# Patient Record
Sex: FEMALE | Race: WHITE | NOT HISPANIC OR LATINO | Employment: FULL TIME
[De-identification: names, ages, dates, MRNs, and addresses within clinical notes are randomized per-mention and may not be internally consistent; named-entity substitution may affect disease eponyms.]

---

## 2015-12-17 LAB
HPV ABSTRACT: NORMAL
PAP-ABSTRACT: NORMAL

## 2017-01-12 ENCOUNTER — RECORDS - HEALTHEAST (OUTPATIENT)
Dept: ADMINISTRATIVE | Facility: OTHER | Age: 52
End: 2017-01-12

## 2017-02-09 ENCOUNTER — COMMUNICATION - HEALTHEAST (OUTPATIENT)
Dept: INTERNAL MEDICINE | Facility: CLINIC | Age: 52
End: 2017-02-09

## 2017-04-18 ENCOUNTER — TRANSFERRED RECORDS (OUTPATIENT)
Dept: HEALTH INFORMATION MANAGEMENT | Facility: CLINIC | Age: 52
End: 2017-04-18

## 2017-04-18 LAB
HPV ABSTRACT: NORMAL
PAP-ABSTRACT: NORMAL

## 2018-07-05 ENCOUNTER — OFFICE VISIT (OUTPATIENT)
Dept: OBGYN | Facility: CLINIC | Age: 53
End: 2018-07-05
Payer: COMMERCIAL

## 2018-07-05 ENCOUNTER — RESULT FOLLOW UP (OUTPATIENT)
Dept: OBGYN | Facility: CLINIC | Age: 53
End: 2018-07-05

## 2018-07-05 VITALS
BODY MASS INDEX: 24.41 KG/M2 | HEIGHT: 64 IN | WEIGHT: 143 LBS | SYSTOLIC BLOOD PRESSURE: 128 MMHG | DIASTOLIC BLOOD PRESSURE: 74 MMHG

## 2018-07-05 DIAGNOSIS — N62 HYPERTROPHY OF BREAST: ICD-10-CM

## 2018-07-05 DIAGNOSIS — Z01.411 ENCOUNTER FOR GYNECOLOGICAL EXAMINATION WITH ABNORMAL FINDING: Primary | ICD-10-CM

## 2018-07-05 DIAGNOSIS — Z12.4 SCREENING FOR MALIGNANT NEOPLASM OF CERVIX: ICD-10-CM

## 2018-07-05 PROCEDURE — 99386 PREV VISIT NEW AGE 40-64: CPT | Performed by: OBSTETRICS & GYNECOLOGY

## 2018-07-05 PROCEDURE — G0145 SCR C/V CYTO,THINLAYER,RESCR: HCPCS | Performed by: OBSTETRICS & GYNECOLOGY

## 2018-07-05 PROCEDURE — 87624 HPV HI-RISK TYP POOLED RSLT: CPT | Performed by: OBSTETRICS & GYNECOLOGY

## 2018-07-05 RX ORDER — FLUTICASONE PROPIONATE 50 MCG
1 SPRAY, SUSPENSION (ML) NASAL DAILY
COMMUNITY
Start: 2017-10-29

## 2018-07-05 RX ORDER — CETIRIZINE HYDROCHLORIDE 10 MG/1
10 TABLET ORAL AT BEDTIME
COMMUNITY
Start: 2017-08-04

## 2018-07-05 RX ORDER — NAPROXEN SODIUM 220 MG
220 TABLET ORAL
COMMUNITY
End: 2022-04-25

## 2018-07-05 RX ORDER — ALBUTEROL SULFATE 90 UG/1
1 AEROSOL, METERED RESPIRATORY (INHALATION)
COMMUNITY
Start: 2017-04-14 | End: 2022-04-25

## 2018-07-05 RX ORDER — LANOLIN ALCOHOL/MO/W.PET/CERES
3 CREAM (GRAM) TOPICAL AT BEDTIME
COMMUNITY

## 2018-07-05 NOTE — LETTER
July 18, 2018      Norah Thomas  [address] [place] [address]    Dear ,      This letter is in regards to the PAP smear and HPV (Human Papillomavirus) test you had done recently. Your PAP test result is normal, but your HPV (Human Papillomavirus) test was positive.     About 80 percent of women have been exposed to HPV virus throughout their lifetime. There is no medication for the treatment of HPV. Typically your own immune system gets rid of the virus before it does harm. HPV is spread by direct skin-to-skin contact, including sexual intercourse, oral sex, anal sex, or any other contact involving the genital area (example: hand to genital contact). It is not possible to become infected with HPV by touching an object, such as a toilet seat. Most people who are infected with HPV have no signs or symptoms.    Things that you can do to boost your immune system and help your body get rid of HPV: get plenty of rest, eat a well-balanced diet of healthy foods, and stop smoking.     Please return in 1 year to repeat your pap smear and HPV test.     If you have additional questions regarding this result, please call [phone].    Sincerely,      Kacie Bautista MD/[facility]

## 2018-07-05 NOTE — NURSING NOTE
"Chief Complaint   Patient presents with     Physical       Initial /74  Ht 5' 3.5\" (1.613 m)  Wt 143 lb (64.9 kg)  LMP  (LMP Unknown)  BMI 24.93 kg/m2 Estimated body mass index is 24.93 kg/(m^2) as calculated from the following:    Height as of this encounter: 5' 3.5\" (1.613 m).    Weight as of this encounter: 143 lb (64.9 kg).  BP completed using cuff size: regular        The following HM Due: NONE      The following patient reported/Care Every where data was sent to:  P ABSTRACT QUALITY INITIATIVES [[id number]]  Colonoscopy done on this date: 10/2017 (approximately), by this group: [facility], results were normal.   Mammogram done on this date: 2018 (approximately), by this group: [facility], results were normal.   Pap smear done on this date: 2017 (approximately), by this group: [facility], results were normal.         patient has appointment for today    Rosa M Olivier CMA                "

## 2018-07-05 NOTE — Clinical Note
Colonoscopy done on this date: 10/2017 (approximately), by this group: [facility], results were normal.  Mammogram done on this date: 4/2018 (approximately), by this group: [facility], results were normal.  Pap smear done on this date: 4/2017 (approximately), by this group: [facility], results were normal.

## 2018-07-05 NOTE — MR AVS SNAPSHOT
"              After Visit Summary   7/5/2018    Norah Guzman    MRN: [id number]           Patient Information     Date Of Birth          1965        Visit Information        Provider Department      7/5/2018 10:30 AM Kacie Bautista MD [facility]        Today's Diagnoses     Encounter for gynecological examination with abnormal finding    -  1    Screening for malignant neoplasm of cervix        Hypertrophy of breast           Follow-ups after your visit        Who to contact     If you have questions or need follow up information about today's clinic visit or your schedule please contact [facility] directly at [phone].  Normal or non-critical lab and imaging results will be communicated to you by MyChart, letter or phone within 4 business days after the clinic has received the results. If you do not hear from us within 7 days, please contact the clinic through [employer] or phone. If you have a critical or abnormal lab result, we will notify you by phone as soon as possible.  Submit refill requests through [employer] or call your pharmacy and they will forward the refill request to us. Please allow 3 business days for your refill to be completed.          Additional Information About Your Visit        MyChart Information     [employer] gives you secure access to your electronic health record. If you see a primary care provider, you can also send messages to your care team and make appointments. If you have questions, please call your primary care clinic.  If you do not have a primary care provider, please call [phone] and they will assist you.        Care EveryWhere ID     This is your Care EveryWhere ID. This could be used by other organizations to access your [place] medical records  [id number]        Your Vitals Were     Height Last Period BMI (Body Mass Index)             5' 3.5\" (1.613 m) (LMP Unknown) 24.93 kg/m2          Blood Pressure from Last 3 " Encounters:   07/05/18 128/74    Weight from Last 3 Encounters:   07/05/18 143 lb (64.9 kg)              We Performed the Following     HPV High Risk Types DNA Cervical     Pap imaged thin layer screen with HPV - recommended age 30 - 65 years (select HPV order below)        Primary Care Provider    None Specified       No primary provider on file.        Equal Access to Services     MARY GARCIA : Hadii aad ku hadhamiltoncandy Calvert, wajeyda deana, qaybta kaalmasaurabh nicolas, tray campanigelmanan goldman . So [facility] [phone].    ATENCIÓN: Si habla español, tiene a brown disposición servicios gratuitos de asistencia lingüística. Llame al [phone].    We comply with applicable federal civil rights laws and [place] laws. We do not discriminate on the basis of race, color, national origin, age, disability, sex, sexual orientation, or gender identity.            Thank you!     Thank you for choosing [facility] [place]  for your care. Our goal is always to provide you with excellent care. Hearing back from our patients is one way we can continue to improve our services. Please take a few minutes to complete the written survey that you may receive in the mail after your visit with us. Thank you!             Your Updated Medication List - Protect others around you: Learn how to safely use, store and throw away your medicines at www.disposemymeds.org.          This list is accurate as of 7/5/18 11:59 PM.  Always use your most recent med list.                   Brand Name Dispense Instructions for use Diagnosis    albuterol 108 (90 Base) MCG/ACT Inhaler    PROAIR HFA/PROVENTIL HFA/VENTOLIN HFA     Inhale 1 puff into the lungs        Calcium carb-Vitamin D 500 mg [place]-200 units 500-200 MG-UNIT per tablet    OSCAL with D;Oyster Shell Calcium     Take 600 mg by mouth        cetirizine 10 MG tablet    zyrTEC     Take 10 mg by mouth        cholecalciferol 1000 UNIT tablet    vitamin D3     Take 1,000 Units  by mouth        fluticasone 50 MCG/ACT spray    FLONASE     1 spray        melatonin 3 MG tablet      Take 3 mg by mouth        naproxen sodium 220 MG tablet    ANAPROX     Take 220 mg by mouth

## 2018-07-05 NOTE — PROGRESS NOTES
Norah is a 52 year old  female who presents for annual exam.     Menses are irregular and normal and heavy lasting variable days.  Menses flow: normal.  No LMP recorded (lmp unknown).. Using none for contraception.  She is not currently considering pregnancy.  Besides routine health maintenance,  she would like to discuss breast reduction. She reports chronic longstanding pain her neck and back. She has needed injections in the past, now using NSAIDs. Has to be cautious with exercise, which she does enjoy. She gets headaches. She gets deep grooves in her shoulders from her bra. She finds breasts difficult to support during exercise. She is up to date on mammogram.  GYNECOLOGIC HISTORY:  Menarche: 11    Norah is sexually active with 1 male partner(s) and is currently in monogamous relationship with .    History sexually transmitted infections:No STD history  STI testing offered?  Declined  MEERA exposure: No  History of abnormal Pap smear: YES - updated in Problem List and Health Maintenance accordingly  Family history of breast CA: Yes (Please explain): maternal great aunt  Family history of uterine/ovarian CA: No    Family history of colon CA: Yes (Please explain): father, maternal grandfather, paternal uncle    HEALTH MAINTENANCE:  Cholesterol: (No results found for: CHOL History of abnormal lipids: No  Mammo: 2018 . History of abnormal Mammo: No.  Regular Self Breast Exams: occ  Calcium/Vitamin D intake: source:  dairy, dietary supplement(s) Adequate? Yes  TSH: (No results found for: TSH )  Pap; (No results found for: PAP )    HISTORY:  Obstetric History       T2      L2     SAB0   TAB0   Ectopic0   Multiple0   Live Births2       # Outcome Date GA Lbr Donnie/2nd Weight Sex Delivery Anes PTL Lv   2 Term     M    [facility]   1 Term     M    [facility]      Complications: Abruptio Placenta        Past Medical History:   Diagnosis Date     Cervical high risk HPV (human papillomavirus) test  positive 07/05/2018 07/05/18: see problem list.      No past surgical history on file.  Family History   Problem Relation Age of Onset     Other - See Comments Mother      bone loss     Colon Cancer Father      Other - See Comments Father      bone loss      Other - See Comments Sister      bone loss      Colon Cancer Maternal Grandfather      Breast Cancer Other      maternal great aunt      Colon Cancer Paternal Uncle      Other - See Comments Sister      bone loss     Uterine Cancer No family hx of      Ovarian Cancer No family hx of      Social History     Social History     Marital status: Single     Spouse name: N/A     Number of children: N/A     Years of education: N/A     Social History Main Topics     Smoking status: Former Smoker     Smokeless tobacco: Never Used     Alcohol use Yes      Comment: couple per week      Drug use: No     Sexual activity: Yes     Partners: Male     Other Topics Concern     None     Social History Narrative     None       Current Outpatient Prescriptions:      Calcium carb-Vitamin D 500 mg [place]-200 units (OYSTER SHELL CALCIUM/D) 500-200 MG-UNIT per tablet, Take 600 mg by mouth, Disp: , Rfl:      cetirizine (ZYRTEC) 10 MG tablet, Take 10 mg by mouth, Disp: , Rfl:      cholecalciferol (VITAMIN D3) 1000 UNIT tablet, Take 1,000 Units by mouth, Disp: , Rfl:      fluticasone (FLONASE) 50 MCG/ACT spray, 1 spray, Disp: , Rfl:      melatonin 3 MG tablet, Take 3 mg by mouth, Disp: , Rfl:      albuterol (PROAIR HFA/PROVENTIL HFA/VENTOLIN HFA) 108 (90 Base) MCG/ACT Inhaler, Inhale 1 puff into the lungs, Disp: , Rfl:      naproxen sodium (ANAPROX) 220 MG tablet, Take 220 mg by mouth, Disp: , Rfl:      Allergies   Allergen Reactions     Codeine Nausea and Vomiting     Fenoprofen Hives     Hydrocodone-Acetaminophen Nausea and Vomiting     Penicillins Unknown     Sulfa Drugs Unknown     Pt doesn't remember type of reaction       Past medical, surgical, social and family history were  "reviewed and updated in EPIC.    ROS:   C:     NEGATIVE for fever, chills, change in weight  I:       NEGATIVE for worrisome rashes, moles or lesions  E:     NEGATIVE for vision changes or irritation  E/M: NEGATIVE for ear, mouth and throat problems  R:     NEGATIVE for significant cough or SOB  CV:   NEGATIVE for chest pain, palpitations or peripheral edema  GI:     NEGATIVE for nausea, abdominal pain, heartburn, or change in bowel habits  :   NEGATIVE for frequency, dysuria, hematuria, vaginal discharge, or irregular bleeding  M:     As above  N:      NEGATIVE for weakness, dizziness or paresthesias  E:      NEGATIVE for temperature intolerance, skin/hair changes  P:      NEGATIVE for changes in mood or affect.    EXAM:  /74  Ht 5' 3.5\" (1.613 m)  Wt 143 lb (64.9 kg)  LMP  (LMP Unknown)  BMI 24.93 kg/m2   BMI: Body mass index is 24.93 kg/(m^2).  Constitutional: healthy, alert and no distress  Head: Normocephalic. No masses, lesions, tenderness or abnormalities  Neck: Neck supple. Trachea midline. No adenopathy. Thyroid symmetric, normal size.   Cardiovascular: RRR.   Respiratory: Negative.   Breast: Breasts reveal mild symmetric fibrocystic densities, but there are no dominant, discrete, fixed or suspicious masses found. Breasts are large and pendulous, out of proportion to her small frame.   Gastrointestinal: Abdomen soft, non-tender, non-distended. No masses, organomegaly.  :  Vulva:  No external lesions, normal female hair distribution, no inguinal adenopathy.    Urethra:  Midline, non-tender, well supported, no discharge  Vagina:  Moist, pink, no abnormal discharge, no lesions  Uterus:  Normal size, anteverted , non-tender, freely mobile  Ovaries:  No masses appreciated, non-tender, mobile  Rectal Exam: deferred  Musculoskeletal: extremities normal  Skin: no suspicious lesions or rashes  Psychiatric: Affect appropriate, cooperative,mentation appears normal.     COUNSELING:   Reviewed preventive " health counseling, as reflected in patient instructions       Regular exercise       Healthy diet/nutrition   reports that she has quit smoking. She has never used smokeless tobacco.    Body mass index is 24.93 kg/(m^2).    FRAX Risk Assessment    ASSESSMENT:  52 year old female with satisfactory annual exam  (Z01.411) Encounter for gynecological examination with abnormal finding  (primary encounter diagnosis)  Comment:   Plan: Lipids normal 2017. Thyroid normal 2017. Mammo 2018. Colonoscopy 2017.  Consider shingles vaccine    (Z12.4) Screening for malignant neoplasm of cervix  (primary encounter diagnosis)  Comment:   Plan: Pap imaged thin layer screen with HPV -         recommended age 30 - 65 years (select HPV order        below), HPV High Risk Types DNA Cervical   Discussed ASCCP guidelines. Has been getting nearly annual pap. Discussed guidelines and rationale. Will do pap and cotest today and transition to ASCCP guidelines.          (N62) Hypertrophy of breast  Comment:   Plan: Will refer for breast reduction.

## 2018-07-10 LAB
COPATH REPORT: NORMAL
PAP: NORMAL

## 2018-07-12 LAB
FINAL DIAGNOSIS: ABNORMAL
HPV HR 12 DNA CVX QL NAA+PROBE: POSITIVE
HPV16 DNA SPEC QL NAA+PROBE: NEGATIVE
HPV18 DNA SPEC QL NAA+PROBE: NEGATIVE
SPECIMEN DESCRIPTION: ABNORMAL
SPECIMEN SOURCE CVX/VAG CYTO: ABNORMAL

## 2018-12-14 ENCOUNTER — RECORDS - HEALTHEAST (OUTPATIENT)
Dept: ADMINISTRATIVE | Facility: OTHER | Age: 53
End: 2018-12-14

## 2019-08-22 ENCOUNTER — OFFICE VISIT (OUTPATIENT)
Dept: OBGYN | Facility: CLINIC | Age: 54
End: 2019-08-22
Payer: COMMERCIAL

## 2019-08-22 ENCOUNTER — RESULT FOLLOW UP (OUTPATIENT)
Dept: OBGYN | Facility: CLINIC | Age: 54
End: 2019-08-22

## 2019-08-22 VITALS
OXYGEN SATURATION: 99 % | WEIGHT: 145 LBS | BODY MASS INDEX: 24.75 KG/M2 | DIASTOLIC BLOOD PRESSURE: 84 MMHG | SYSTOLIC BLOOD PRESSURE: 125 MMHG | HEART RATE: 83 BPM | HEIGHT: 64 IN

## 2019-08-22 DIAGNOSIS — Z01.419 ENCOUNTER FOR GYNECOLOGICAL EXAMINATION WITHOUT ABNORMAL FINDING: Primary | ICD-10-CM

## 2019-08-22 DIAGNOSIS — Z12.31 ENCOUNTER FOR SCREENING MAMMOGRAM FOR BREAST CANCER: ICD-10-CM

## 2019-08-22 DIAGNOSIS — R87.810 CERVICAL HIGH RISK HPV (HUMAN PAPILLOMAVIRUS) TEST POSITIVE: ICD-10-CM

## 2019-08-22 DIAGNOSIS — Z12.4 SCREENING FOR MALIGNANT NEOPLASM OF CERVIX: ICD-10-CM

## 2019-08-22 PROCEDURE — 99396 PREV VISIT EST AGE 40-64: CPT | Performed by: OBSTETRICS & GYNECOLOGY

## 2019-08-22 PROCEDURE — G0123 SCREEN CERV/VAG THIN LAYER: HCPCS | Performed by: OBSTETRICS & GYNECOLOGY

## 2019-08-22 PROCEDURE — 87624 HPV HI-RISK TYP POOLED RSLT: CPT | Performed by: OBSTETRICS & GYNECOLOGY

## 2019-08-22 ASSESSMENT — ANXIETY QUESTIONNAIRES
7. FEELING AFRAID AS IF SOMETHING AWFUL MIGHT HAPPEN: NOT AT ALL
1. FEELING NERVOUS, ANXIOUS, OR ON EDGE: SEVERAL DAYS
3. WORRYING TOO MUCH ABOUT DIFFERENT THINGS: SEVERAL DAYS
5. BEING SO RESTLESS THAT IT IS HARD TO SIT STILL: MORE THAN HALF THE DAYS
6. BECOMING EASILY ANNOYED OR IRRITABLE: MORE THAN HALF THE DAYS
GAD7 TOTAL SCORE: 8
2. NOT BEING ABLE TO STOP OR CONTROL WORRYING: SEVERAL DAYS

## 2019-08-22 ASSESSMENT — PATIENT HEALTH QUESTIONNAIRE - PHQ9
5. POOR APPETITE OR OVEREATING: SEVERAL DAYS
SUM OF ALL RESPONSES TO PHQ QUESTIONS 1-9: 8

## 2019-08-22 ASSESSMENT — MIFFLIN-ST. JEOR: SCORE: 1234.78

## 2019-08-22 NOTE — PROGRESS NOTES
Norah is a 53 year old  female who presents for annual exam.     Menses are irregular and normal and heavy lasting variable days with hot flashes. Menses flow: normal.  LMP: 2019 Using none for contraception.  She is not currently considering pregnancy.  Besides routine health maintenance, she has no other health concerns today .   Had breast reduction and is very pleased. Mammogram in January (wait one year after surgery).   GYNECOLOGIC HISTORY:  Menarche: 11  Age at first intercourse: declined   Number of lifetime partners: declined  Norah is sexually active with 1 male partner(s) and is currently in monogamous relationship with partner.   History sexually transmitted infections:No STD history  STI testing offered?  Declined  MEERA exposure: No  History of abnormal Pap smear: YES - updated in Problem List and Health Maintenance accordingly  Family history of breast CA: Yes (Please explain): maternal great aunt  Family history of uterine/ovarian CA: No     Family history of colon CA: Yes (Please explain): father, maternal grandfather, paternal uncle  HEALTH MAINTENANCE:  Cholesterol: (No results found for: CHOL History of abnormal lipids: No  Mammo: 2018 not one till 2020 . History of abnormal Mammo: No.  Regular Self Breast Exams: Yes  Calcium/Vitamin D intake: source: dairy, dietary supplement(s) Adequate? Yes  TSH: (No results found for: TSH )  Pap; (No results found for: PAP )  Lab Results   Component Value Date    PAP NIL 2018    )    HISTORY:  OB History    Para Term  AB Living   2 2 2 0 0 2   SAB TAB Ectopic Multiple Live Births   0 0 0 0 2      # Outcome Date GA Lbr Donnie/2nd Weight Sex Delivery Anes PTL Lv   2 Term     M    [facility]   1 Term     M    [facility]      Complications: Abruptio Placenta     Past Medical History:   Diagnosis Date     Cervical high risk HPV (human papillomavirus) test positive 2018: see problem list.      History reviewed. No  pertinent surgical history.  Family History   Problem Relation Age of Onset     Other - See Comments Mother         bone loss     Colon Cancer Father      Other - See Comments Father         bone loss      Other - See Comments Sister         bone loss      Colon Cancer Maternal Grandfather      Breast Cancer Other         maternal great aunt      Colon Cancer Paternal Uncle      Other - See Comments Sister         bone loss     Uterine Cancer No family hx of      Ovarian Cancer No family hx of      Social History     Socioeconomic History     Marital status: Single     Spouse name: Not on file     Number of children: Not on file     Years of education: Not on file     Highest education level: Not on file   Occupational History     Not on file   Social Needs     Financial resource strain: Not on file     Food insecurity:     Worry: Not on file     Inability: Not on file     Transportation needs:     Medical: Not on file     Non-medical: Not on file   Tobacco Use     Smoking status: Former Smoker     Smokeless tobacco: Never Used   Substance and Sexual Activity     Alcohol use: Yes     Comment: couple per week      Drug use: No     Sexual activity: Yes     Partners: Male   Lifestyle     Physical activity:     Days per week: Not on file     Minutes per session: Not on file     Stress: Not on file   Relationships     Social connections:     Talks on phone: Not on file     Gets together: Not on file     Attends Gnosticist service: Not on file     Active member of club or organization: Not on file     Attends meetings of clubs or organizations: Not on file     Relationship status: Not on file     Intimate partner violence:     Fear of current or ex partner: Not on file     Emotionally abused: Not on file     Physically abused: Not on file     Forced sexual activity: Not on file   Other Topics Concern     Not on file   Social History Narrative     Not on file       Current Outpatient Medications:      albuterol (PROAIR  "HFA/PROVENTIL HFA/VENTOLIN HFA) 108 (90 Base) MCG/ACT Inhaler, Inhale 1 puff into the lungs, Disp: , Rfl:      Calcium carb-Vitamin D 500 mg [place]-200 units (OYSTER SHELL CALCIUM/D) 500-200 MG-UNIT per tablet, Take 600 mg by mouth, Disp: , Rfl:      cetirizine (ZYRTEC) 10 MG tablet, Take 10 mg by mouth, Disp: , Rfl:      cholecalciferol (VITAMIN D3) 1000 UNIT tablet, Take 1,000 Units by mouth, Disp: , Rfl:      fluticasone (FLONASE) 50 MCG/ACT spray, 1 spray, Disp: , Rfl:      melatonin 3 MG tablet, Take 3 mg by mouth, Disp: , Rfl:      naproxen sodium (ANAPROX) 220 MG tablet, Take 220 mg by mouth, Disp: , Rfl:      Allergies   Allergen Reactions     Codeine Nausea and Vomiting     Fenoprofen Hives     Hydrocodone-Acetaminophen Nausea and Vomiting     Penicillins Unknown     Sulfa Drugs Unknown     Pt doesn't remember type of reaction       Past medical, surgical, social and family history were reviewed and updated in EPIC.    ROS:   C:     NEGATIVE for fever, chills, change in weight  I:       NEGATIVE for worrisome rashes, moles or lesions  E:     NEGATIVE for vision changes or irritation  E/M: NEGATIVE for ear, mouth and throat problems  R:     NEGATIVE for significant cough or SOB  CV:   NEGATIVE for chest pain, palpitations or peripheral edema  GI:     NEGATIVE for nausea, abdominal pain, heartburn, or change in bowel habits  :   NEGATIVE for frequency, dysuria, hematuria, vaginal discharge, or irregular bleeding  M:     NEGATIVE for significant arthralgias or myalgia  N:      NEGATIVE for weakness, dizziness or paresthesias  E:      NEGATIVE for temperature intolerance, skin/hair changes  P:      NEGATIVE for changes in mood or affect.    EXAM:  /84   Pulse 83   Ht 1.613 m (5' 3.5\")   Wt 65.8 kg (145 lb)   LMP 03/01/2019 (Approximate)   SpO2 99%   Breastfeeding? No   BMI 25.28 kg/m     BMI: Body mass index is 25.28 kg/m .  Constitutional: healthy, alert and no distress  Head: Normocephalic. No " masses, lesions, tenderness or abnormalities  Neck: Neck supple. Trachea midline. No adenopathy. Thyroid symmetric, normal size.   Cardiovascular: RRR.   Respiratory: Negative.   Breast: Breasts reveal mild symmetric fibrocystic densities, but there are no dominant, discrete, fixed or suspicious masses found.  Gastrointestinal: Abdomen soft, non-tender, non-distended. No masses, organomegaly.  :  Vulva:  No external lesions, normal female hair distribution, no inguinal adenopathy.    Urethra:  Midline, non-tender, well supported, no discharge  Vagina:  Moist, pink, no abnormal discharge, no lesions  Uterus:  Normal size, anteverted , non-tender, freely mobile. Cervix small, no lesions, evidence of prior excisional procedure.   Ovaries:  No masses appreciated, non-tender, mobile  Rectal Exam: deferred  Musculoskeletal: extremities normal  Skin: no suspicious lesions or rashes  Psychiatric: Affect appropriate, cooperative,mentation appears normal.     COUNSELING:   Reviewed preventive health counseling, as reflected in patient instructions       Regular exercise       Healthy diet/nutrition   reports that she has quit smoking. She has never used smokeless tobacco.    Body mass index is 25.28 kg/m .  Weight management plan: Discussed healthy diet and exercise guidelines  FRAX Risk Assessment    ASSESSMENT:  53 year old female with satisfactory annual exam  (Z01.419) Encounter for gynecological examination without abnormal finding  (primary encounter diagnosis)  Comment:   Plan: Pap today  Mammogram in January    (Z12.4) Screening for malignant neoplasm of cervix  Comment:   Plan: Pap imaged thin layer screen with HPV -         recommended age 30 - 65 years (select HPV order        below), HPV High Risk Types DNA Cervical       (Z12.31) Encounter for screening mammogram for breast cancer  Comment:   Plan: *MA Screening Digital Bilateral        Does at [facility]

## 2019-08-22 NOTE — NURSING NOTE
"Chief Complaint   Patient presents with     Physical       Initial /84   Pulse 83   Ht 1.613 m (5' 3.5\")   Wt 65.8 kg (145 lb)   LMP 2019 (Approximate)   SpO2 99%   Breastfeeding? No   BMI 25.28 kg/m   Estimated body mass index is 25.28 kg/m  as calculated from the following:    Height as of this encounter: 1.613 m (5' 3.5\").    Weight as of this encounter: 65.8 kg (145 lb).  BP completed using cuff size: regular    Questioned patient about current smoking habits.  Pt. quit smoking some time ago.          The following HM Due: pap smear and shingles    The following patient reported/Care Every where data was sent to:  P ABSTRACT QUALITY INITIATIVES [[id number]]  n/a      patient has appointment for today              "

## 2019-08-22 NOTE — LETTER
September 9, 2019    Norah Thomas  [address] [place] [address]    Dear MsAdrianaThomas,  This letter is regarding your recent Pap smear (cervical cancer screening) and Human Papillomavirus (HPV) test.  We are happy to inform you that your Pap smear result is normal. Cervical cancer is closely linked with certain types of HPV. Your results showed no evidence of high-risk HPV.  We recommend you have your next PAP smear and HPV test in 1 year.  You will still need to return to the clinic every year for an annual exam and other preventive tests.  If you have additional questions regarding this result, please call our registered nurse, Makayla at [phone].  Sincerely,    Kacie Bautista MD /[facility]

## 2019-08-23 ASSESSMENT — ANXIETY QUESTIONNAIRES: GAD7 TOTAL SCORE: 8

## 2019-08-29 LAB
FINAL DIAGNOSIS: NORMAL
HPV HR 12 DNA CVX QL NAA+PROBE: NEGATIVE
HPV16 DNA SPEC QL NAA+PROBE: NEGATIVE
HPV18 DNA SPEC QL NAA+PROBE: NEGATIVE
SPECIMEN DESCRIPTION: NORMAL
SPECIMEN SOURCE CVX/VAG CYTO: NORMAL

## 2019-08-30 LAB
COPATH REPORT: NORMAL
PAP: NORMAL

## 2019-09-09 NOTE — PROGRESS NOTES
07/05/18: NIL pap, + HR HPV (not 16 or 18) result. Plan cotest in 1 year.   8/22/19 NIL, Neg HPV. Plan per provider, Patient wants to do q1 year for now due to h/o LEEP. Pt was advised.  09/09/19 Result letter sent at request of RN. ([facility])

## 2020-03-11 ENCOUNTER — HEALTH MAINTENANCE LETTER (OUTPATIENT)
Age: 55
End: 2020-03-11

## 2021-01-03 ENCOUNTER — HEALTH MAINTENANCE LETTER (OUTPATIENT)
Age: 56
End: 2021-01-03

## 2021-03-25 ENCOUNTER — TRANSFERRED RECORDS (OUTPATIENT)
Dept: MULTI SPECIALTY CLINIC | Facility: CLINIC | Age: 56
End: 2021-03-25

## 2021-05-06 NOTE — PROGRESS NOTES
Please abstract the following data from this visit with this patient into the appropriate field in Epic:    Tests that can be patient reported without a hard copy:    Mammogram done on this date: 3/25/2021 (approximately), by this group: [facility], results were negative.       Norah is a 55 year old  who presents for annual exam.   Postmenopausal since 54.  She is having hot flashes, mild to moderate, night sweats, vaginal dryness, decreased libido and decreased mental sharpness. No vaginal bleeding noted.     Besides routine health maintenance, she has no other health concerns today .  Working and getting her masters.  Her mom moved to town last year.   Started meds for hypertension    GYNECOLOGIC HISTORY:  Menarche: 11   Age at first intercourse: 14 Number of lifetime partners: more than 6  She is not sexually active.    History sexually transmitted infections:No STD history  STI testing offered?  Declined  Estrogen replacement therapy: No  MEERA exposure: No    History of abnormal Pap smear: YES - updated in Problem List and Health Maintenance accordingly  Family history of breast CA: Yes (Please explain): maternal great aunt  Family history of uterine/ovarian CA: No  Family history of colon CA: Yes (Please explain): father, maternal grandfather, paternal uncle    HEALTH MAINTENANCE:  Cholesterol: (No components found for: CHOL2 ) History of abnormal lipids: No  Mammo: 3/25/2021 . History of abnormal Mammo: yes, benign  tumor removed in the middle .  Regular Self Breast Exams: sometimes  Colonoscopy: 10/26/2017 History of abnormal Colonoscopy: benign polyps removed  Dexa: 2017 History of abnormal Dexa: Yes and osteopenia  Calcium/Vitamin D intake: source: some dairy, dietary supplement(s), green leafy veggies (more in summertime) Adequate? Yes  TSH: (No components found for: TSH1 )  Pap; (  Lab Results   Component Value Date    PAP NIL 2019    PAP NIL 2018    )    HISTORY:  OB History     Para Term  AB Living   2 2 2 0 0 2   SAB TAB Ectopic Multiple Live Births   0 0 0 0 2      # Outcome Date GA Lbr Donnie/2nd Weight Sex Delivery Anes PTL Lv   2 Term     M    [facility]   1 Term     M    [facility]      Complications: Abruptio Placenta     Past Medical History:   Diagnosis Date     Cervical high risk HPV (human papillomavirus) test positive 2018: see problem list.      Past Surgical History:   Procedure Laterality Date     MAMMOPLASTY REDUCTION BILATERAL       Family History   Problem Relation Age of Onset     Other - See Comments Mother         bone loss     Hypertension Mother      Colon Cancer Father      Other - See Comments Father         bone loss      Other - See Comments Sister         bone loss      Hypertension Sister      Colon Cancer Maternal Grandfather      Breast Cancer Other         maternal great aunt      Colon Cancer Paternal Uncle      Other - See Comments Sister         bone loss     Hypertension Sister      Uterine Cancer No family hx of      Ovarian Cancer No family hx of      Social History     Socioeconomic History     Marital status: Single     Spouse name: Not on file     Number of children: Not on file     Years of education: Not on file     Highest education level: Not on file   Occupational History     Not on file   Social Needs     Financial resource strain: Not on file     Food insecurity     Worry: Not on file     Inability: Not on file     Transportation needs     Medical: Not on file     Non-medical: Not on file   Tobacco Use     Smoking status: Former Smoker     Smokeless tobacco: Never Used   Substance and Sexual Activity     Alcohol use: Yes     Comment: couple per week      Drug use: No     Sexual activity: Yes     Partners: Male     Birth control/protection: Female Surgical     Comment: Tubal    Lifestyle     Physical activity     Days per week: Not on file     Minutes per session: Not on file     Stress: Not on file   Relationships      Social connections     Talks on phone: Not on file     Gets together: Not on file     Attends Sikhism service: Not on file     Active member of club or organization: Not on file     Attends meetings of clubs or organizations: Not on file     Relationship status: Not on file     Intimate partner violence     Fear of current or ex partner: Not on file     Emotionally abused: Not on file     Physically abused: Not on file     Forced sexual activity: Not on file   Other Topics Concern     Parent/sibling w/ CABG, MI or angioplasty before 65F 55M? Not Asked   Social History Narrative     Not on file       Current Outpatient Medications:      amLODIPine (NORVASC) 5 MG tablet, Take 5 mg by mouth daily, Disp: , Rfl:      Calcium carb-Vitamin D 500 mg [place]-200 units (OYSTER SHELL CALCIUM/D) 500-200 MG-UNIT per tablet, Take 600 mg by mouth, Disp: , Rfl:      cetirizine (ZYRTEC) 10 MG tablet, Take 10 mg by mouth, Disp: , Rfl:      cholecalciferol (VITAMIN D3) 1000 UNIT tablet, Take 1,000 Units by mouth, Disp: , Rfl:      fluticasone (FLONASE) 50 MCG/ACT spray, 1 spray, Disp: , Rfl:      Ibuprofen (ADVIL PO), , Disp: , Rfl:      melatonin 3 MG tablet, Take 3 mg by mouth, Disp: , Rfl:      albuterol (PROAIR HFA/PROVENTIL HFA/VENTOLIN HFA) 108 (90 Base) MCG/ACT Inhaler, Inhale 1 puff into the lungs, Disp: , Rfl:      naproxen sodium (ANAPROX) 220 MG tablet, Take 220 mg by mouth, Disp: , Rfl:      Allergies   Allergen Reactions     Fenoprofen Hives     Codeine Nausea and Vomiting     Hydrocodone-Acetaminophen Nausea and Vomiting     Penicillins Unknown and Other (See Comments)     Pt doesn't remember type of reaction  Pt doesn't remember type of reaction       Sulfa Drugs Unknown     Pt doesn't remember type of reaction     Venlafaxine Other (See Comments)     Pt seen in ER for HTN, tachycardia, etc.   Made all vital sign go very high, Went to Er         Past medical, surgical, social and family history were reviewed and  "updated in EPIC.    ROS:   C:       NEGATIVE for fever, chills, change in weight  I:         NEGATIVE for worrisome rashes, moles or lesions  E:       NEGATIVE for vision changes or irritation  E/M:   NEGATIVE for ear, mouth and throat problems  R:       NEGATIVE for significant cough or SOB  CV:     NEGATIVE for chest pain, palpitations or peripheral edema  GI:      NEGATIVE for nausea, abdominal pain, heartburn, or change in bowel habits  :    NEGATIVE for frequency, dysuria, hematuria, vaginal discharge, or bleeding  M:       NEGATIVE for significant arthralgias or myalgia  N:       NEGATIVE for weakness, dizziness or paresthesias  E:       NEGATIVE for temperature intolerance, skin/hair changes  P:       NEGATIVE for changes in mood or affect    EXAM:  /88 (BP Location: Right arm, Patient Position: Sitting, Cuff Size: Adult Regular)   Pulse 81   Temp 97  F (36.1  C) (Oral)   Ht 1.588 m (5' 2.5\")   Wt 67.9 kg (149 lb 10.1 oz)   LMP 03/01/2019 (Approximate)   Breastfeeding No   BMI 26.93 kg/m     BMI: Body mass index is 26.93 kg/m .  Constitutional: healthy, alert and no distress  Head: Normocephalic. No masses, lesions, tenderness or abnormalities  Neck: Neck supple. Trachea midline. No adenopathy. Thyroid symmetric, normal size.   Cardiovascular: RRR.   Respiratory: Negative.   Breast: Breasts reveal mild symmetric fibrocystic densities, but there are no dominant, discrete, fixed or suspicious masses found.  Gastrointestinal: Abdomen soft, non-tender, non-distended. No masses, organomegaly  :  Vulva:  No external lesions, normal female hair distribution, no inguinal adenopathy.    Urethra:  Midline, non-tender, well supported, no discharge  Vagina:  Atrophic, no abnormal discharge, no lesions  Uterus:  Normal size, anteverted , non-tender, freely mobile  Ovaries:  No masses appreciated, non-tender, mobile  Rectal Exam: deferred  Musculoskeletal: extremities normal  Skin: no suspicious lesions or " inez  Psychiatric: Affect appropriate, cooperative,mentation appears normal.     COUNSELING:   Reviewed preventive health counseling, as reflected in patient instructions       Regular exercise       Healthy diet/nutrition       Colon cancer screening       Consider lung cancer screening for ages 55-80 years and 30 pack-year smoking history Discussed smoking history- maybe has 30 pack year history. Smoked on and off over the years.    reports that she quit smoking about 3 years ago. She started smoking about 45 years ago. She has never used smokeless tobacco.    Body mass index is 26.93 kg/m .  Weight management plan: Discussed healthy diet and exercise guidelines    FRAX Risk Assessment  ASSESSMENT:  55 year old  with satisfactory annual exam  (Z00.00) Routine general medical examination at a health care facility  (primary encounter diagnosis)  Comment:   Plan: HPV High Risk Types DNA Cervical, Pap imaged         thin layer screen with HPV - recommended age 30        - 65 years (select HPV order below)            (Z12.4) Screening for cervical cancer  Comment:   Plan: HPV High Risk Types DNA Cervical, Pap imaged         thin layer screen with HPV - recommended age 30        - 65 years (select HPV order below)            (Z12.31) Encounter for screening mammogram for malignant neoplasm of breast  Comment:   Plan: MA Screening Digital Bilateral

## 2021-05-11 ENCOUNTER — OFFICE VISIT (OUTPATIENT)
Dept: OBGYN | Facility: CLINIC | Age: 56
End: 2021-05-11
Payer: COMMERCIAL

## 2021-05-11 VITALS
WEIGHT: 149.63 LBS | BODY MASS INDEX: 26.51 KG/M2 | HEART RATE: 81 BPM | DIASTOLIC BLOOD PRESSURE: 88 MMHG | HEIGHT: 63 IN | TEMPERATURE: 97 F | SYSTOLIC BLOOD PRESSURE: 124 MMHG

## 2021-05-11 DIAGNOSIS — Z12.4 SCREENING FOR CERVICAL CANCER: ICD-10-CM

## 2021-05-11 DIAGNOSIS — Z00.00 ROUTINE GENERAL MEDICAL EXAMINATION AT A HEALTH CARE FACILITY: Primary | ICD-10-CM

## 2021-05-11 DIAGNOSIS — Z12.31 ENCOUNTER FOR SCREENING MAMMOGRAM FOR MALIGNANT NEOPLASM OF BREAST: ICD-10-CM

## 2021-05-11 PROBLEM — K35.80 ACUTE APPENDICITIS: Status: ACTIVE | Noted: 2020-07-23

## 2021-05-11 PROBLEM — H10.13 ALLERGIC CONJUNCTIVITIS, BILATERAL: Status: ACTIVE | Noted: 2017-11-14

## 2021-05-11 PROBLEM — K58.9 IBS (IRRITABLE BOWEL SYNDROME): Status: ACTIVE | Noted: 2020-07-23

## 2021-05-11 PROBLEM — M85.80 OSTEOPENIA: Status: ACTIVE | Noted: 2017-04-29

## 2021-05-11 PROBLEM — N62 MACROMASTIA: Status: ACTIVE | Noted: 2018-10-31

## 2021-05-11 PROBLEM — J30.9 ALLERGIC RHINITIS DUE TO ALLERGEN: Status: ACTIVE | Noted: 2017-11-14

## 2021-05-11 PROCEDURE — 88175 CYTOPATH C/V AUTO FLUID REDO: CPT | Performed by: OBSTETRICS & GYNECOLOGY

## 2021-05-11 PROCEDURE — 87624 HPV HI-RISK TYP POOLED RSLT: CPT | Performed by: OBSTETRICS & GYNECOLOGY

## 2021-05-11 PROCEDURE — 99396 PREV VISIT EST AGE 40-64: CPT | Performed by: OBSTETRICS & GYNECOLOGY

## 2021-05-11 RX ORDER — AZELASTINE 1 MG/ML
1 SPRAY, METERED NASAL
COMMUNITY
End: 2022-04-25

## 2021-05-11 RX ORDER — AMLODIPINE BESYLATE 5 MG/1
5 TABLET ORAL DAILY
COMMUNITY
Start: 2021-03-27

## 2021-05-11 RX ORDER — OLOPATADINE HYDROCHLORIDE 2 MG/ML
1-2 SOLUTION/ DROPS OPHTHALMIC
COMMUNITY
End: 2022-04-25

## 2021-05-11 RX ORDER — IBUPROFEN 200 MG
500 CAPSULE ORAL
COMMUNITY
End: 2022-04-25

## 2021-05-11 RX ORDER — TURMERIC ROOT EXTRACT 500 MG
1000 TABLET ORAL
COMMUNITY
End: 2022-04-25

## 2021-05-11 SDOH — HEALTH STABILITY: MENTAL HEALTH: HOW OFTEN DO YOU HAVE 6 OR MORE DRINKS ON ONE OCCASION?: NOT ASKED

## 2021-05-11 SDOH — HEALTH STABILITY: MENTAL HEALTH: HOW MANY STANDARD DRINKS CONTAINING ALCOHOL DO YOU HAVE ON A TYPICAL DAY?: 1 OR 2

## 2021-05-11 SDOH — HEALTH STABILITY: MENTAL HEALTH: HOW OFTEN DO YOU HAVE A DRINK CONTAINING ALCOHOL?: 4 OR MORE TIMES A WEEK

## 2021-05-11 ASSESSMENT — ANXIETY QUESTIONNAIRES
5. BEING SO RESTLESS THAT IT IS HARD TO SIT STILL: NEARLY EVERY DAY
6. BECOMING EASILY ANNOYED OR IRRITABLE: SEVERAL DAYS
IF YOU CHECKED OFF ANY PROBLEMS ON THIS QUESTIONNAIRE, HOW DIFFICULT HAVE THESE PROBLEMS MADE IT FOR YOU TO DO YOUR WORK, TAKE CARE OF THINGS AT HOME, OR GET ALONG WITH OTHER PEOPLE: SOMEWHAT DIFFICULT
3. WORRYING TOO MUCH ABOUT DIFFERENT THINGS: SEVERAL DAYS
2. NOT BEING ABLE TO STOP OR CONTROL WORRYING: SEVERAL DAYS
7. FEELING AFRAID AS IF SOMETHING AWFUL MIGHT HAPPEN: NOT AT ALL
GAD7 TOTAL SCORE: 9
1. FEELING NERVOUS, ANXIOUS, OR ON EDGE: SEVERAL DAYS

## 2021-05-11 ASSESSMENT — PATIENT HEALTH QUESTIONNAIRE - PHQ9
5. POOR APPETITE OR OVEREATING: MORE THAN HALF THE DAYS
SUM OF ALL RESPONSES TO PHQ QUESTIONS 1-9: 10

## 2021-05-11 ASSESSMENT — MIFFLIN-ST. JEOR: SCORE: 1229.91

## 2021-05-11 NOTE — Clinical Note
Mammogram done on this date: 3/25/2021 (approximately), by this group: [facility], results were negative.

## 2021-05-12 ASSESSMENT — ANXIETY QUESTIONNAIRES: GAD7 TOTAL SCORE: 9

## 2021-05-14 LAB
COPATH REPORT: NORMAL
PAP: NORMAL

## 2021-05-18 ENCOUNTER — PATIENT OUTREACH (OUTPATIENT)
Dept: OBGYN | Facility: CLINIC | Age: 56
End: 2021-05-18

## 2021-10-10 ENCOUNTER — HEALTH MAINTENANCE LETTER (OUTPATIENT)
Age: 56
End: 2021-10-10

## 2022-04-25 ENCOUNTER — HOSPITAL ENCOUNTER (INPATIENT)
Facility: CLINIC | Age: 57
LOS: 1 days | Discharge: HOME | End: 2022-04-26
Attending: EMERGENCY MEDICINE | Admitting: FAMILY MEDICINE
Payer: COMMERCIAL

## 2022-04-25 ENCOUNTER — APPOINTMENT (OUTPATIENT)
Dept: CT IMAGING | Facility: CLINIC | Age: 57
End: 2022-04-25
Attending: EMERGENCY MEDICINE
Payer: COMMERCIAL

## 2022-04-25 DIAGNOSIS — R10.13 ABDOMINAL PAIN, EPIGASTRIC: ICD-10-CM

## 2022-04-25 DIAGNOSIS — K25.3 ACUTE GASTRIC ULCER WITHOUT HEMORRHAGE OR PERFORATION: Primary | ICD-10-CM

## 2022-04-25 LAB
ABO/RH(D): NORMAL
ALBUMIN SERPL-MCNC: 3.8 G/DL (ref 3.5–5)
ALP SERPL-CCNC: 118 U/L (ref 45–120)
ALT SERPL W P-5'-P-CCNC: 12 U/L (ref 0–45)
ANION GAP SERPL CALCULATED.3IONS-SCNC: 12 MMOL/L (ref 5–18)
ANTIBODY SCREEN: NEGATIVE
APTT PPP: 25 SECONDS (ref 22–38)
AST SERPL W P-5'-P-CCNC: 15 U/L (ref 0–40)
BASOPHILS # BLD AUTO: 0 10E3/UL (ref 0–0.2)
BASOPHILS NFR BLD AUTO: 0 %
BILIRUB SERPL-MCNC: 1.4 MG/DL (ref 0–1)
BUN SERPL-MCNC: 10 MG/DL (ref 8–22)
CALCIUM SERPL-MCNC: 9.8 MG/DL (ref 8.5–10.5)
CHLORIDE BLD-SCNC: 102 MMOL/L (ref 98–107)
CO2 SERPL-SCNC: 26 MMOL/L (ref 22–31)
CREAT SERPL-MCNC: 0.83 MG/DL (ref 0.6–1.1)
EOSINOPHIL # BLD AUTO: 0 10E3/UL (ref 0–0.7)
EOSINOPHIL NFR BLD AUTO: 0 %
ERYTHROCYTE [DISTWIDTH] IN BLOOD BY AUTOMATED COUNT: 12.8 % (ref 10–15)
GFR SERPL CREATININE-BSD FRML MDRD: 82 ML/MIN/1.73M2
GLUCOSE BLD-MCNC: 104 MG/DL (ref 70–125)
HCT VFR BLD AUTO: 43.8 % (ref 35–47)
HGB BLD-MCNC: 13.9 G/DL (ref 11.7–15.7)
IMM GRANULOCYTES # BLD: 0 10E3/UL
IMM GRANULOCYTES NFR BLD: 0 %
INR PPP: 1.01 (ref 0.85–1.15)
LIPASE SERPL-CCNC: 49 U/L (ref 0–52)
LYMPHOCYTES # BLD AUTO: 1.4 10E3/UL (ref 0.8–5.3)
LYMPHOCYTES NFR BLD AUTO: 19 %
MCH RBC QN AUTO: 26.4 PG (ref 26.5–33)
MCHC RBC AUTO-ENTMCNC: 31.7 G/DL (ref 31.5–36.5)
MCV RBC AUTO: 83 FL (ref 78–100)
MONOCYTES # BLD AUTO: 0.5 10E3/UL (ref 0–1.3)
MONOCYTES NFR BLD AUTO: 6 %
NEUTROPHILS # BLD AUTO: 5.3 10E3/UL (ref 1.6–8.3)
NEUTROPHILS NFR BLD AUTO: 75 %
NRBC # BLD AUTO: 0 10E3/UL
NRBC BLD AUTO-RTO: 0 /100
PLATELET # BLD AUTO: 313 10E3/UL (ref 150–450)
POTASSIUM BLD-SCNC: 4 MMOL/L (ref 3.5–5)
PROT SERPL-MCNC: 7.3 G/DL (ref 6–8)
RBC # BLD AUTO: 5.27 10E6/UL (ref 3.8–5.2)
SODIUM SERPL-SCNC: 140 MMOL/L (ref 136–145)
SPECIMEN EXPIRATION DATE: NORMAL
WBC # BLD AUTO: 7.1 10E3/UL (ref 4–11)

## 2022-04-25 PROCEDURE — 85610 PROTHROMBIN TIME: CPT | Performed by: EMERGENCY MEDICINE

## 2022-04-25 PROCEDURE — 36415 COLL VENOUS BLD VENIPUNCTURE: CPT | Performed by: EMERGENCY MEDICINE

## 2022-04-25 PROCEDURE — 86901 BLOOD TYPING SEROLOGIC RH(D): CPT | Performed by: EMERGENCY MEDICINE

## 2022-04-25 PROCEDURE — 258N000003 HC RX IP 258 OP 636: Performed by: EMERGENCY MEDICINE

## 2022-04-25 PROCEDURE — G0378 HOSPITAL OBSERVATION PER HR: HCPCS

## 2022-04-25 PROCEDURE — 93005 ELECTROCARDIOGRAM TRACING: CPT

## 2022-04-25 PROCEDURE — C9113 INJ PANTOPRAZOLE SODIUM, VIA: HCPCS | Performed by: EMERGENCY MEDICINE

## 2022-04-25 PROCEDURE — 120N000001 HC R&B MED SURG/OB

## 2022-04-25 PROCEDURE — 999N000250 HC STATISTIC ECG ASSIST

## 2022-04-25 PROCEDURE — 96361 HYDRATE IV INFUSION ADD-ON: CPT

## 2022-04-25 PROCEDURE — 85730 THROMBOPLASTIN TIME PARTIAL: CPT | Performed by: EMERGENCY MEDICINE

## 2022-04-25 PROCEDURE — 93010 ELECTROCARDIOGRAM REPORT: CPT | Performed by: INTERNAL MEDICINE

## 2022-04-25 PROCEDURE — 250N000013 HC RX MED GY IP 250 OP 250 PS 637

## 2022-04-25 PROCEDURE — 96374 THER/PROPH/DIAG INJ IV PUSH: CPT

## 2022-04-25 PROCEDURE — 96376 TX/PRO/DX INJ SAME DRUG ADON: CPT

## 2022-04-25 PROCEDURE — 86850 RBC ANTIBODY SCREEN: CPT | Performed by: EMERGENCY MEDICINE

## 2022-04-25 PROCEDURE — 85018 HEMOGLOBIN: CPT | Performed by: EMERGENCY MEDICINE

## 2022-04-25 PROCEDURE — 258N000003 HC RX IP 258 OP 636

## 2022-04-25 PROCEDURE — 250N000011 HC RX IP 250 OP 636

## 2022-04-25 PROCEDURE — 74176 CT ABD & PELVIS W/O CONTRAST: CPT

## 2022-04-25 PROCEDURE — 99223 1ST HOSP IP/OBS HIGH 75: CPT | Mod: AI

## 2022-04-25 PROCEDURE — 80053 COMPREHEN METABOLIC PANEL: CPT | Performed by: EMERGENCY MEDICINE

## 2022-04-25 PROCEDURE — 250N000011 HC RX IP 250 OP 636: Performed by: EMERGENCY MEDICINE

## 2022-04-25 PROCEDURE — 83690 ASSAY OF LIPASE: CPT | Performed by: EMERGENCY MEDICINE

## 2022-04-25 PROCEDURE — C9113 INJ PANTOPRAZOLE SODIUM, VIA: HCPCS

## 2022-04-25 PROCEDURE — 99285 EMERGENCY DEPT VISIT HI MDM: CPT | Mod: 25

## 2022-04-25 PROCEDURE — 96375 TX/PRO/DX INJ NEW DRUG ADDON: CPT

## 2022-04-25 RX ORDER — MORPHINE SULFATE 2 MG/ML
2 INJECTION, SOLUTION INTRAMUSCULAR; INTRAVENOUS EVERY 4 HOURS PRN
Status: DISCONTINUED | OUTPATIENT
Start: 2022-04-25 | End: 2022-04-26 | Stop reason: HOSPADM

## 2022-04-25 RX ORDER — ONDANSETRON 2 MG/ML
4 INJECTION INTRAMUSCULAR; INTRAVENOUS ONCE
Status: COMPLETED | OUTPATIENT
Start: 2022-04-25 | End: 2022-04-25

## 2022-04-25 RX ORDER — NALOXONE HYDROCHLORIDE 0.4 MG/ML
0.4 INJECTION, SOLUTION INTRAMUSCULAR; INTRAVENOUS; SUBCUTANEOUS
Status: DISCONTINUED | OUTPATIENT
Start: 2022-04-25 | End: 2022-04-26 | Stop reason: HOSPADM

## 2022-04-25 RX ORDER — AMLODIPINE BESYLATE 5 MG/1
5 TABLET ORAL DAILY
Status: DISCONTINUED | OUTPATIENT
Start: 2022-04-26 | End: 2022-04-26 | Stop reason: HOSPADM

## 2022-04-25 RX ORDER — SODIUM CHLORIDE 9 MG/ML
INJECTION, SOLUTION INTRAVENOUS CONTINUOUS
Status: DISCONTINUED | OUTPATIENT
Start: 2022-04-25 | End: 2022-04-26 | Stop reason: HOSPADM

## 2022-04-25 RX ORDER — ONDANSETRON 2 MG/ML
4 INJECTION INTRAMUSCULAR; INTRAVENOUS EVERY 6 HOURS PRN
Status: DISCONTINUED | OUTPATIENT
Start: 2022-04-25 | End: 2022-04-26 | Stop reason: HOSPADM

## 2022-04-25 RX ORDER — ACETAMINOPHEN 325 MG/1
650 TABLET ORAL EVERY 6 HOURS PRN
Status: DISCONTINUED | OUTPATIENT
Start: 2022-04-25 | End: 2022-04-26 | Stop reason: HOSPADM

## 2022-04-25 RX ORDER — NALOXONE HYDROCHLORIDE 0.4 MG/ML
0.2 INJECTION, SOLUTION INTRAMUSCULAR; INTRAVENOUS; SUBCUTANEOUS
Status: DISCONTINUED | OUTPATIENT
Start: 2022-04-25 | End: 2022-04-26 | Stop reason: HOSPADM

## 2022-04-25 RX ORDER — LIDOCAINE 40 MG/G
CREAM TOPICAL
Status: DISCONTINUED | OUTPATIENT
Start: 2022-04-25 | End: 2022-04-26 | Stop reason: HOSPADM

## 2022-04-25 RX ORDER — ONDANSETRON 4 MG/1
4 TABLET, ORALLY DISINTEGRATING ORAL EVERY 6 HOURS PRN
Status: DISCONTINUED | OUTPATIENT
Start: 2022-04-25 | End: 2022-04-26 | Stop reason: HOSPADM

## 2022-04-25 RX ORDER — CALCIUM CARBONATE 500 MG/1
2 TABLET, CHEWABLE ORAL AT BEDTIME
COMMUNITY

## 2022-04-25 RX ORDER — ACETAMINOPHEN 650 MG/1
650 SUPPOSITORY RECTAL EVERY 6 HOURS PRN
Status: DISCONTINUED | OUTPATIENT
Start: 2022-04-25 | End: 2022-04-26 | Stop reason: HOSPADM

## 2022-04-25 RX ADMIN — PANTOPRAZOLE SODIUM 40 MG: 40 INJECTION, POWDER, FOR SOLUTION INTRAVENOUS at 14:50

## 2022-04-25 RX ADMIN — PANTOPRAZOLE SODIUM 40 MG: 40 INJECTION, POWDER, FOR SOLUTION INTRAVENOUS at 12:13

## 2022-04-25 RX ADMIN — PANTOPRAZOLE SODIUM 40 MG: 40 INJECTION, POWDER, FOR SOLUTION INTRAVENOUS at 22:24

## 2022-04-25 RX ADMIN — ONDANSETRON 4 MG: 2 INJECTION INTRAMUSCULAR; INTRAVENOUS at 17:57

## 2022-04-25 RX ADMIN — ONDANSETRON 4 MG: 2 INJECTION INTRAMUSCULAR; INTRAVENOUS at 12:13

## 2022-04-25 RX ADMIN — SODIUM CHLORIDE: 9 INJECTION, SOLUTION INTRAVENOUS at 14:41

## 2022-04-25 RX ADMIN — ACETAMINOPHEN 650 MG: 325 TABLET ORAL at 14:41

## 2022-04-25 RX ADMIN — ACETAMINOPHEN 650 MG: 325 TABLET ORAL at 22:33

## 2022-04-25 RX ADMIN — SODIUM CHLORIDE 1000 ML: 9 INJECTION, SOLUTION INTRAVENOUS at 12:12

## 2022-04-25 RX ADMIN — MORPHINE SULFATE 2 MG: 2 INJECTION, SOLUTION INTRAMUSCULAR; INTRAVENOUS at 17:57

## 2022-04-25 RX ADMIN — SODIUM CHLORIDE: 9 INJECTION, SOLUTION INTRAVENOUS at 23:28

## 2022-04-25 ASSESSMENT — ACTIVITIES OF DAILY LIVING (ADL)
ADLS_ACUITY_SCORE: 8
ADLS_ACUITY_SCORE: 8
ADLS_ACUITY_SCORE: 4
ADLS_ACUITY_SCORE: 8

## 2022-04-25 ASSESSMENT — ENCOUNTER SYMPTOMS
NAUSEA: 1
BLOOD IN STOOL: 0
VOMITING: 1
ABDOMINAL PAIN: 1
DIARRHEA: 0

## 2022-04-25 NOTE — ED PROVIDER NOTES
EMERGENCY DEPARTMENT ENCOUNTER      NAME: Norah Guzman  AGE: 56 year old female  YOB: 1965  MRN: [id number]  EVALUATION DATE & TIME: 2022 11:34 AM    PCP: Heladio Romeo    ED PROVIDER: Cruz Jackson M.D.      Chief Complaint   Patient presents with     Abdominal Pain         FINAL IMPRESSION:  1.  Acute abdominal pain.  2.  Suspected gastric ulceration.  3.  Suspected hematemesis.    ED COURSE & MEDICAL DECISION MAKIN:50 AM I met with the patient to gather history and to perform my initial exam. We discussed plans for the ED course, including diagnostic testing and treatment. PPE worn: cloth mask.  Patient with dark black nausea and vomiting and epigastric pain for 2 days.  No dark or tarry stools.  No history of GI bleeding.  Patient on baby aspirin daily.  Previous tubal ligation.  1:45 PM.  Results communicated to the patient.  Laboratory work is essentially unremarkable other than borderline bilirubin at 1.4.  Lipase and liver function test normal.  Chemistries negative.  CBC negative including hemoglobin of 13.9.  Coagulation functions were negative.  Blood type a positive.  Patient feeling better after IV fluids, Zofran, Protonix.  CT with concerning findings given her history of black nausea and vomiting.  Plan admit patient to [facility].  The of service doctor will probably want to consult [facility] for upper endoscopy.  Patient agreement with the plan.  Will page the admitting service.  1:51 PM.  Family practice resident called back and accepted the patient for [facility] admission.  They note they will consult [facility] for upper endoscopy.    Pertinent Labs & Imaging studies reviewed. (See chart for details)    56 year old female presents to the Emergency Department for evaluation of abdominal pain with a black nausea and vomiting.    At the conclusion of the encounter I discussed the results of all of the tests and the disposition. The questions were answered. The  "patient or family acknowledged understanding and was agreeable with the care plan.            MEDICATIONS GIVEN IN THE EMERGENCY:  Medications - No data to display    NEW PRESCRIPTIONS STARTED AT TODAY'S ER VISIT  New Prescriptions    No medications on file          =================================================================    HPI    Patient information was obtained from: Patient    Use of : N/A        Norah Guzman is a 56 year old female with a pertinent history of IBS, s/p appendectomy, s/p endometrial ablation (2006), s/p tubal ligation who presents to this ED by walk in for evaluation of abdominal pain, black vomit. Patient reports she developed sharp epigastric abdominal pain yesterday morning. At night time patient developed nausea and had an episode of \"very dark black vomit. However, she states her vomit did not appear like coffee ground emesis. She currently endorses abdominal pain, but denies nausea. Patient was able to sip on water earlier this morning.    Denies any black or bloody stools or stool changes. No prior history of gastric ulcers or esophageal varices. No other complaints at this time.    Does not identify any waxing or waning symptoms otherwise, exacerbating or alleviating features,associated symptoms except as mentioned.    REVIEW OF SYSTEMS   Review of Systems   Gastrointestinal: Positive for abdominal pain (sharp, epigastric), nausea (resolved) and vomiting (black vomit). Negative for blood in stool and diarrhea.   All other systems reviewed and are negative.       PAST MEDICAL HISTORY:  Past Medical History:   Diagnosis Date     Cervical high risk HPV (human papillomavirus) test positive 07/05/2018 07/05/18: see problem list.      Essential hypertension        PAST SURGICAL HISTORY:  Past Surgical History:   Procedure Laterality Date     APPENDECTOMY       ARTHROSCOPY KNEE Right 2005     BIOPSY CERVICAL, LOCAL EXCISION, SINGLE/MULTIPLE       BREAST BIOPSY, RT/LT " Right      ENDOMETRIAL ABLATION       ENDOMETRIAL ABLATION  2006     KNEE ARTHROSCOPY W/ DEBRIDEMENT Right      MAMMOPLASTY REDUCTION BILATERAL       TUBAL LIGATION       TUBAL LIGATION  1991           CURRENT MEDICATIONS:    albuterol (PROAIR HFA/PROVENTIL HFA/VENTOLIN HFA) 108 (90 Base) MCG/ACT Inhaler  amLODIPine (NORVASC) 5 MG tablet  aspirin (ASA) 81 MG EC tablet  azelastine (ASTELIN) 0.1 % nasal spray  Calcium carb-Vitamin D 500 mg [place]-200 units (OYSTER SHELL CALCIUM/D) 500-200 MG-UNIT per tablet  calcium carbonate 500 mg, elemental, (OSCAL 500) 1250 (500 Ca) MG TABS tablet  cetirizine (ZYRTEC) 10 MG tablet  cholecalciferol (VITAMIN D3) 1000 UNIT tablet  fluticasone (FLONASE) 50 MCG/ACT spray  Ibuprofen (ADVIL PO)  melatonin 3 MG tablet  naproxen sodium (ANAPROX) 220 MG tablet  olopatadine (PATADAY) 0.2 % ophthalmic solution  Turmeric 500 MG TABS        ALLERGIES:  Allergies   Allergen Reactions     Fenoprofen Hives     Codeine Nausea and Vomiting     Hydrocodone-Acetaminophen Nausea and Vomiting     Penicillins Unknown and Other (See Comments)     Pt doesn't remember type of reaction  Pt doesn't remember type of reaction       Sulfa Drugs Unknown     Pt doesn't remember type of reaction     Venlafaxine Other (See Comments)     Pt seen in ER for HTN, tachycardia, etc.   Made all vital sign go very high, Went to Er         FAMILY HISTORY:  Family History   Problem Relation Age of Onset     Other - See Comments Mother         bone loss     Hypertension Mother      Colon Cancer Father      Other - See Comments Father         bone loss      Other - See Comments Sister         bone loss      Hypertension Sister      Colon Cancer Maternal Grandfather      Breast Cancer Other         maternal great aunt      Colon Cancer Paternal Uncle      Other - See Comments Sister         bone loss     Hypertension Sister      Uterine Cancer No family hx of      Ovarian Cancer No family hx of      Osteoporosis Mother       "Osteoporosis Father      Alzheimer Disease Maternal Grandfather      Colon Cancer Paternal Grandfather      Colon Cancer Paternal Uncle        SOCIAL HISTORY:   Social History     Socioeconomic History     Marital status: Single   Tobacco Use     Smoking status: Former Smoker     Start date:      Quit date: 2018     Years since quittin.3     Smokeless tobacco: Never Used   Substance and Sexual Activity     Alcohol use: Yes     Comment: one per night     Drug use: No     Sexual activity: Not Currently     Partners: Male     Birth control/protection: Female Surgical, Post-menopausal     Comment: Tubal    Former smoker, occasional alcohol, no drugs.    VITALS:  BP (!) 143/109   Pulse 108   Temp 98.9  F (37.2  C) (Oral)   Resp 18   Ht 1.575 m (5' 2\")   Wt 64.9 kg (143 lb)   LMP 2019 (Approximate)   SpO2 98%   BMI 26.16 kg/m      PHYSICAL EXAM    Vital Signs:  BP (!) 143/109   Pulse 108   Temp 98.9  F (37.2  C) (Oral)   Resp 18   Ht 1.575 m (5' 2\")   Wt 64.9 kg (143 lb)   LMP 2019 (Approximate)   SpO2 98%   BMI 26.16 kg/m    General:  On entering the room she is in no apparent distress.    Neck:  Neck supple with full range of motion and nontender.    Back:  Back and spine are nontender.  No costovertebral angle tenderness.    HEENT:  Oropharynx clear with moist mucous membranes.  HEENT unremarkable.    Pulmonary:  Chest clear to auscultation without rhonchi rales or wheezing.    Cardiovascular:  Cardiac regular rate and rhythm without murmurs rubs or gallops.    Abdomen:  Abdomen soft nontender and mild to moderate discomfort in the epigastric area..  There is no rebound or guarding.    Muskuloskeletal:  she moves all 4 without any difficulty and has normal neurovascular exams.  Extremities without clubbing, cyanosis, or edema.  Legs and calves are nontender.   Neuro:  she is alert and oriented ×3 and moves all extremities symmetrically.    Psych:  Normal affect.    Skin:  " Unremarkable and warm and dry.       LAB:  All pertinent labs reviewed and interpreted.  Labs Ordered and Resulted from Time of ED Arrival to Time of ED Departure   COMPREHENSIVE METABOLIC PANEL - Abnormal       Result Value    Sodium 140      Potassium 4.0      Chloride 102      Carbon Dioxide (CO2) 26      Anion Gap 12      Urea Nitrogen 10      Creatinine 0.83      Calcium 9.8      Glucose 104      Alkaline Phosphatase 118      AST 15      ALT 12      Protein Total 7.3      Albumin 3.8      Bilirubin Total 1.4 (*)     GFR Estimate 82     CBC WITH PLATELETS AND DIFFERENTIAL - Abnormal    WBC Count 7.1      RBC Count 5.27 (*)     Hemoglobin 13.9      Hematocrit 43.8      MCV 83      MCH 26.4 (*)     MCHC 31.7      RDW 12.8      Platelet Count 313      % Neutrophils 75      % Lymphocytes 19      % Monocytes 6      % Eosinophils 0      % Basophils 0      % Immature Granulocytes 0      NRBCs per 100 WBC 0      Absolute Neutrophils 5.3      Absolute Lymphocytes 1.4      Absolute Monocytes 0.5      Absolute Eosinophils 0.0      Absolute Basophils 0.0      Absolute Immature Granulocytes 0.0      Absolute NRBCs 0.0     LIPASE - Normal    Lipase 49     INR - Normal    INR 1.01     PARTIAL THROMBOPLASTIN TIME - Normal    aPTT 25     OCCULT BLOOD GASTRIC POCT   TYPE AND SCREEN, ADULT    ABO/RH(D) O POS      Antibody Screen Negative      SPECIMEN EXPIRATION DATE [id number]     ABO/RH TYPE AND SCREEN       RADIOLOGY:  Reviewed all pertinent imaging. Please see official radiology report.  Abd/pelvis CT no contrast - Stone Protocol   Final Result   IMPRESSION:       Focal wall thickening of the distal stomach. This could relate to a distal gastric ulcer or ulcerated mass. GI consultation for potential upper endoscopy is suggested.                    EKG:            PROCEDURES:         IKenny, am serving as a scribe to document services personally performed by Dr. Jackson based on my observation and the  provider's statements to me. I, Cruz Jackson MD attest that Kenny Will is acting in a scribe capacity, has observed my performance of the services and has documented them in accordance with my direction.    Cruz Jackson M.D.  Emergency Medicine  [facility] EMERGENCY ROOM  [address] [id number]  [phone]  Dept: [phone]     Cruz Jackson MD  04/25/22 1341       Cruz Jackson MD  04/25/22 1135

## 2022-04-25 NOTE — ED TRIAGE NOTES
Pt reports she woke up with epigastric pain that lasted all day and continues into today.  Pt reports emesis that was dark black.  Pain does not radiate.  No changes in bowel habits.  History of IBS but does not feel like that at all.

## 2022-04-25 NOTE — H&P
Admission History and Physical   Norah Guzman,  1965, MRN [id number]    [facility]  Abdominal pain, epigastric [R10.13]    PCP: Yehuda Jonas, [phone]   Code status:  Full Code       Extended Emergency Contact Information  Primary Emergency Contact: Jaylin Rasmussen   [place]  Mobile Phone: [phone]  Relation: Sister  Secondary Emergency Contact: Jaylin De Guzman   [facility]  Home Phone: [phone]  Mobile Phone: [phone]  Relation: Other       Chief Complaint: Nausea, black emesis, abdominal pain     Assessment and Plan:   Norah Guzman is a 56 year old female with a past medical history of HTN, Asthma, GERD, IBS who presented to the [facility] Emergency Department on the day of admission with complaints of nausea, black emesis, and abdominal pain, concerning for upper GI bleed.     Abdominal Pain  Concern for Upper GI Bleed  Gastric ulcer vs ulcerated mass on CT  Presented with 2 days of acute abdominal pain, and dark emesis x 1. Has extensive history of NSAID use over several years, but increased in the past 3 months following hip surgery. Hgb stable at 13 on admission. Likely NSAID induced gastritis/ulcer. Type and screen completed in ED. CT of abdomen/pelvis showing: Focal wall thickening of the distal stomach. This could relate to a distal gastric ulcer or ulcerated mass.  - Consult GI, appreciate recs  - Clear liquid diet, NPO at midnight  - IV Protonix 40 mg BID Q12  -  ml/hr   - IV Zofran/Morphine PRN  - Avoid NSAIDs  - Hold PTA ASA, Ibuprofen, Naproxen    HTN  Hypertensive on arrival to 143/109. Likely elevated 2/2 pain.   - Continue PTA Amlodipine  - EKG    Chronic Medical Conditions:  GERD: IV Protonix as above. Hold PTA TUMS  Asthma: Monitor. Patient not using Albuterol in many years  Seasonal Allergies: Hold PTA Cetrizine, Flonase     FEN:  Fluids:  ml/hr; Diet: Clear Liquids, NPO at midnight   PPX: Protonix, SCD's   Disposition: 1-2 days pending  GI consult  Status: Inpatient    Patient discussed with attending physician, Dr. Jarrod Reeder , who agrees with the plan.     Aleta Esposito MD PGY1 4/25/2022  [facility]  Pager: [phone] (from 8AM-5:30PM)  Please call the senior pager with any questions or concerns, 24/7: [phone].    HPI:    Norah Guzman is a 56 year old old female with a past medical history of HTN, GERD, Asthma, IBS who presented to the [facility] Emergency Department on the day of admission with complaints of nausea, dark emesis, and abdominal pain.     Patient reports that she began noticing increased abdominal pain about 2 days ago and it has progressed to constant 8 out of 10 pain.  She describes the pain as burning constant severe, causes her to remain hunched over. She also began began having nausea at this time and last night she had 3 episodes of vomiting, noting that her emesis was black or dark in color.  She attempted to take left over antinausea medication without relief.  She is also noticed poor appetite.  She contacted her PCP this morning but was unable to get an appointment for more than 2days so after speaking with nurse triage she presented to [facility] ED for further evaluation.    She reports that she had left hip arthroplasty in January 2022.  At this time she was taking ibuprofen and Tylenol as needed for pain pretty regularly.  Her hip pain has improved but she is continue to take ibuprofen off and on for pain prevention prior to physical therapy.  2 days ago she was going to go hiking with her sister and took 800 mg of ibuprofen.  Since her hip surgery, she does note regular ibuprofen use, and she does have a history of taking naproxen and ibuprofen regularly for many years for neck and back pain.  She was told previously to take naproxen at bedtime to help keep her neck inflammation down, so she takes that most nights.  She also takes a baby aspirin  "every day and has so for many years for the possible benefit of colon cancer prevention due to a strong family history.  She also drinks 1 alcoholic drink at night. Also, she notes that she recently ran out of her OTC Famotidine medication and has not taken it in more than 1 week. She has history of IBS and GERD, she followed with a GI doctor in [place] for a while but has had overall manageable symptoms and has not established with anyone in [place].  She does not feel like this pain is her normal IBS pain.    She denies fever, chills, chest pain, shortness of breath, diarrhea, black or bloody stools, no urinary symptoms.    On arrival to the ED, her vital signs were notable for blood pressure 143/109 and heart rate 108.  She was afebrile and oxygen was 98% on room air.  BMP was within normal limits.  Bilirubin 1.4.  Hemoglobin 13.9.  ALT AST lipase within normal limits. CT Abdomen/pelvis showed: Focal wall thickening of the distal stomach. This could relate to a distal gastric ulcer or ulcerated mass. GI consultation for potential upper endoscopy is suggested. She was given IV Protonix, Zofran, and 1 L normal saline bolus, and admitted for further workup and GI consultation.     History is provided by patient, who is a good historian.      Emergency Department Course:    Upon presentation to the Emergency Department the patient's vital signs were    ED Triage Vitals [04/25/22 1129]   Enc Vitals Group      BP (!) 143/109      Pulse 108      Resp 18      Temp 98.9  F (37.2  C)      Temp src Oral      SpO2 98 %      Weight 64.9 kg (143 lb)      Height 1.575 m (5' 2\")      Head Circumference       Peak Flow       Pain Score       Pain Loc       Pain Edu?       Excl. in GC?      Patient was admitted to the hospital for further workup and management.     Medical History  HTN  Asthma  IBS  GERD Surgical History  She  has a past surgical history that includes Mammoplasty reduction bilateral; appendectomy; Endometrial " Ablation; tubal ligation; breast biopsy, rt/lt (Right); Knee Arthroscopy W/ Debridement (Right); Endometrial Ablation (2006); tubal ligation (1991); Arthroscopy knee (Right, 2005); and Biopsy cervical, local excision, single/multiple.  Left hip arthroplasty (01/2022)   Social History & Habits  she  reports that she quit smoking about 4 years ago. She started smoking about 46 years ago. She has never used smokeless tobacco. She reports current alcohol use. She reports that she does not use drugs.    1 glass of alcohol per evening.    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         Allergies  Allergies   Allergen Reactions     Fenoprofen Hives     Codeine Nausea and Vomiting     Hydrocodone-Acetaminophen Nausea and Vomiting     Penicillins Unknown and Other (See Comments)     Pt doesn't remember type of reaction  Pt doesn't remember type of reaction       Sulfa Drugs Unknown     Pt doesn't remember type of reaction     Venlafaxine Other (See Comments)     Pt seen in ER for HTN, tachycardia, etc.   Made all vital sign go very high, Went to Er      Family History  family history includes Alzheimer Disease in her maternal grandfather; Breast Cancer in an other family member; Colon Cancer in her father, maternal grandfather, paternal grandfather, paternal uncle, and paternal uncle; Hypertension in her mother, sister, and sister; Osteoporosis in her father and mother; Other - See Comments in her father, mother, sister, and sister.     Psychosocial Needs  Social History     Social History Narrative     Not on file     Additional psychosocial needs reviewed per nursing assessment.       Prior to Admission Medications   Medications Prior to Admission   Medication Sig Dispense Refill Last Dose     amLODIPine (NORVASC) 5 MG tablet Take 5 mg by mouth daily   4/25/2022 at Unknown time      "aspirin (ASA) 81 MG EC tablet Take 81 mg by mouth At Bedtime   Past Week at Unknown time     calcium carbonate (TUMS) 500 MG chewable tablet Take 2 chew tab by mouth At Bedtime   4/24/2022 at Unknown time     cetirizine (ZYRTEC) 10 MG tablet Take 10 mg by mouth At Bedtime   4/24/2022 at Unknown time     fluticasone (FLONASE) 50 MCG/ACT spray Spray 1 spray into both nostrils daily   4/25/2022 at Unknown time     melatonin 3 MG tablet Take 3 mg by mouth At Bedtime   4/24/2022 at Unknown time            Review of Systems:    Constitutional: negative  Eyes: negative  Respiratory: negative  Cardiovascular: negative  Gastrointestinal: positive for abdominal pain, dyspepsia, nausea, reflux symptoms and vomiting  Genitourinary:negative  Hematologic/lymphatic: negative  Musculoskeletal:negative  Neurological: negative  Behavioral/Psych: negative Physical Exam:   Temp:  [98.9  F (37.2  C)] 98.9  F (37.2  C)  Pulse:  [108] 108  Resp:  [18] 18  BP: (143)/(109) 143/109  SpO2:  [98 %] 98 %  /86 (BP Location: Left arm, Patient Position: Semi-Booth's)   Pulse 97   Temp 98.4  F (36.9  C) (Oral)   Resp 18   Ht 1.575 m (5' 2\")   Wt 64.9 kg (143 lb)   LMP 03/01/2019 (Approximate)   SpO2 96%   BMI 26.16 kg/m    General appearance: alert, appears stated age and cooperative  Head: Normocephalic, without obvious abnormality, atraumatic  Eyes: EOM intact  Neck: no adenopathy, supple, symmetrical, trachea midline and thyroid not enlarged, symmetric, no tenderness/mass/nodules  Lungs: clear to auscultation bilaterally  Heart: Tachycardic, regular rhythm, S1, S2 present  Abdomen: +epigastric pain, tender to palpation, soft, normal bowel sounds  Extremities: Warm, dry, no swelling  Pulses: 2+ and symmetric  Skin: Skin color, texture, turgor normal. No rashes or lesions  Neurologic: Grossly normal     Pertinent Labs  Lab Results: personally reviewed.   Results for orders placed or performed during the hospital encounter of " 04/25/22   Abd/pelvis CT no contrast - Stone Protocol     Status: None    Narrative    EXAM: CT ABDOMEN PELVIS W/O CONTRAST  LOCATION: [facility] [place]  DATE/TIME: 4/25/2022 12:27 PM    INDICATION: Abdominal distension  COMPARISON: None.  TECHNIQUE: CT scan of the abdomen and pelvis was performed without IV contrast. Multiplanar reformats were obtained. Dose reduction techniques were used.  CONTRAST: None.    FINDINGS:   LOWER CHEST: Normal.    HEPATOBILIARY: Several circumscribed fluid attenuation lesions are present in the liver, several of which are subcentimeter and difficult to definitively characterize but all typical for benign cysts. Liver is normal in size and has a smooth capsule. No   calcified gallstones or gallbladder distention. No bile duct enlargement.    PANCREAS: Normal.    SPLEEN: Normal.    ADRENAL GLANDS: Normal.    KIDNEYS/BLADDER: Kidneys are symmetric and normal in size. No nephrolithiasis or hydronephrosis. Ureters are normal caliber. Normal urinary bladder.    BOWEL: Localized wall thickening of the distal stomach (series 5, image 18) measuring just under 5 cm in length. No inflammatory stranding in the fat around the distal stomach. Appendix not definitively identified. No pericecal inflammation normal   colonic stool burden. No bowel wall thickening or inflammatory stranding in the mesentery.    LYMPH NODES: Normal.    VASCULATURE: Unremarkable.    PELVIC ORGANS: Uterus is normal in size. No pelvic mass or free fluid.    MUSCULOSKELETAL: Status post left hip replacement. Normal alignment of the prosthesis and no periprosthetic fracture. Benign subchondral cystic change in the anterior right acetabulum. Disc space narrowing of the lumbar spine greatest at L1-L2. No   aggressive or destructive bone lesions are present.      Impression    IMPRESSION:     Focal wall thickening of the distal stomach. This could relate to a distal gastric ulcer or ulcerated mass. GI  consultation for potential upper endoscopy is suggested.     Comprehensive metabolic panel     Status: Abnormal   Result Value Ref Range    Sodium 140 136 - 145 mmol/L    Potassium 4.0 3.5 - 5.0 mmol/L    Chloride 102 98 - 107 mmol/L    Carbon Dioxide (CO2) 26 22 - 31 mmol/L    Anion Gap 12 5 - 18 mmol/L    Urea Nitrogen 10 8 - 22 mg/dL    Creatinine 0.83 0.60 - 1.10 mg/dL    Calcium 9.8 8.5 - 10.5 mg/dL    Glucose 104 70 - 125 mg/dL    Alkaline Phosphatase 118 45 - 120 U/L    AST 15 0 - 40 U/L    ALT 12 0 - 45 U/L    Protein Total 7.3 6.0 - 8.0 g/dL    Albumin 3.8 3.5 - 5.0 g/dL    Bilirubin Total 1.4 (H) 0.0 - 1.0 mg/dL    GFR Estimate 82 >60 mL/min/1.73m2   Lipase     Status: Normal   Result Value Ref Range    Lipase 49 0 - 52 U/L   INR     Status: Normal   Result Value Ref Range    INR 1.01 0.85 - 1.15   PTT     Status: Normal   Result Value Ref Range    aPTT 25 22 - 38 Seconds   CBC with platelets and differential     Status: Abnormal   Result Value Ref Range    WBC Count 7.1 4.0 - 11.0 10e3/uL    RBC Count 5.27 (H) 3.80 - 5.20 10e6/uL    Hemoglobin 13.9 11.7 - 15.7 g/dL    Hematocrit 43.8 35.0 - 47.0 %    MCV 83 78 - 100 fL    MCH 26.4 (L) 26.5 - 33.0 pg    MCHC 31.7 31.5 - 36.5 g/dL    RDW 12.8 10.0 - 15.0 %    Platelet Count 313 150 - 450 10e3/uL    % Neutrophils 75 %    % Lymphocytes 19 %    % Monocytes 6 %    % Eosinophils 0 %    % Basophils 0 %    % Immature Granulocytes 0 %    NRBCs per 100 WBC 0 <1 /100    Absolute Neutrophils 5.3 1.6 - 8.3 10e3/uL    Absolute Lymphocytes 1.4 0.8 - 5.3 10e3/uL    Absolute Monocytes 0.5 0.0 - 1.3 10e3/uL    Absolute Eosinophils 0.0 0.0 - 0.7 10e3/uL    Absolute Basophils 0.0 0.0 - 0.2 10e3/uL    Absolute Immature Granulocytes 0.0 <=0.4 10e3/uL    Absolute NRBCs 0.0 10e3/uL   Adult Type and Screen     Status: None   Result Value Ref Range    ABO/RH(D) O POS     Antibody Screen Negative Negative    SPECIMEN EXPIRATION DATE [id number]    CBC with platelets +  differential     Status: Abnormal    Narrative    The following orders were created for panel order CBC with platelets + differential.  Procedure                               Abnormality         Status                     ---------                               -----------         ------                     CBC with platelets and d...[[id number]]  Abnormal            Final result                 Please view results for these tests on the individual orders.   ABO/Rh type and screen     Status: None    Narrative    The following orders were created for panel order ABO/Rh type and screen.  Procedure                               Abnormality         Status                     ---------                               -----------         ------                     Adult Type and Screen[[id number]]                            Final result                 Please view results for these tests on the individual orders.        Pertinent Radiology:  Radiology Results: personally reviewed.   Abd/pelvis CT no contrast - Stone Protocol    Result Date: 4/25/2022  EXAM: CT ABDOMEN PELVIS W/O CONTRAST LOCATION: [facility] DATE/TIME: 4/25/2022 12:27 PM INDICATION: Abdominal distension COMPARISON: None. TECHNIQUE: CT scan of the abdomen and pelvis was performed without IV contrast. Multiplanar reformats were obtained. Dose reduction techniques were used. CONTRAST: None. FINDINGS: LOWER CHEST: Normal. HEPATOBILIARY: Several circumscribed fluid attenuation lesions are present in the liver, several of which are subcentimeter and difficult to definitively characterize but all typical for benign cysts. Liver is normal in size and has a smooth capsule. No calcified gallstones or gallbladder distention. No bile duct enlargement. PANCREAS: Normal. SPLEEN: Normal. ADRENAL GLANDS: Normal. KIDNEYS/BLADDER: Kidneys are symmetric and normal in size. No nephrolithiasis or hydronephrosis. Ureters are normal caliber. Normal  urinary bladder. BOWEL: Localized wall thickening of the distal stomach (series 5, image 18) measuring just under 5 cm in length. No inflammatory stranding in the fat around the distal stomach. Appendix not definitively identified. No pericecal inflammation normal colonic stool burden. No bowel wall thickening or inflammatory stranding in the mesentery. LYMPH NODES: Normal. VASCULATURE: Unremarkable. PELVIC ORGANS: Uterus is normal in size. No pelvic mass or free fluid. MUSCULOSKELETAL: Status post left hip replacement. Normal alignment of the prosthesis and no periprosthetic fracture. Benign subchondral cystic change in the anterior right acetabulum. Disc space narrowing of the lumbar spine greatest at L1-L2. No aggressive or destructive bone lesions are present.     IMPRESSION: Focal wall thickening of the distal stomach. This could relate to a distal gastric ulcer or ulcerated mass. GI consultation for potential upper endoscopy is suggested.       Cardiographics:   EKG Results: personally reviewed.  and not yet available.  EKG: Not available yet.    This note was created with help of Dragon dictation system. Grammatical /typing errors are not intentional.    Aleta Esposito MD   4/25/2022

## 2022-04-25 NOTE — PHARMACY-ADMISSION MEDICATION HISTORY
Pharmacy Note - Admission Medication History    Pertinent Provider Information: None     ______________________________________________________________________    Prior To Admission (PTA) med list completed and updated in EMR.       PTA Med List   Medication Sig Last Dose     amLODIPine (NORVASC) 5 MG tablet Take 5 mg by mouth daily 4/25/2022 at Unknown time     aspirin (ASA) 81 MG EC tablet Take 81 mg by mouth At Bedtime Past Week at Unknown time     calcium carbonate (TUMS) 500 MG chewable tablet Take 2 chew tab by mouth At Bedtime 4/24/2022 at Unknown time     cetirizine (ZYRTEC) 10 MG tablet Take 10 mg by mouth At Bedtime 4/24/2022 at Unknown time     fluticasone (FLONASE) 50 MCG/ACT spray Spray 1 spray into both nostrils daily 4/25/2022 at Unknown time     melatonin 3 MG tablet Take 3 mg by mouth At Bedtime 4/24/2022 at Unknown time       Information source(s): Patient  Method of interview communication: in-person    Summary of Changes to PTA Med List  New: None  Discontinued: None  Changed: None    Patient was asked about OTC/herbal products specifically.  PTA med list reflects this.    In the past week, patient estimated taking medication this percent of the time:  greater than 90%.    Allergies were reviewed, assessed, and updated with the patient.      Patient does not use any multi-dose medications prior to admission.    The information provided in this note is only as accurate as the sources available at the time of the update(s).    Thank you for the opportunity to participate in the care of this patient.    Misha Mcdonald [facility]  4/25/2022 2:04 PM

## 2022-04-26 ENCOUNTER — ANESTHESIA EVENT (OUTPATIENT)
Dept: SURGERY | Facility: CLINIC | Age: 57
End: 2022-04-26
Payer: COMMERCIAL

## 2022-04-26 ENCOUNTER — ANESTHESIA (OUTPATIENT)
Dept: SURGERY | Facility: CLINIC | Age: 57
End: 2022-04-26
Payer: COMMERCIAL

## 2022-04-26 VITALS
HEART RATE: 87 BPM | HEIGHT: 62 IN | BODY MASS INDEX: 26.35 KG/M2 | RESPIRATION RATE: 16 BRPM | TEMPERATURE: 98.4 F | DIASTOLIC BLOOD PRESSURE: 81 MMHG | OXYGEN SATURATION: 99 % | WEIGHT: 143.2 LBS | SYSTOLIC BLOOD PRESSURE: 149 MMHG

## 2022-04-26 LAB
ANION GAP SERPL CALCULATED.3IONS-SCNC: 7 MMOL/L (ref 5–18)
ATRIAL RATE - MUSE: 73 BPM
ATRIAL RATE - MUSE: 90 BPM
BUN SERPL-MCNC: 6 MG/DL (ref 8–22)
CALCIUM SERPL-MCNC: 8.8 MG/DL (ref 8.5–10.5)
CHLORIDE BLD-SCNC: 109 MMOL/L (ref 98–107)
CO2 SERPL-SCNC: 27 MMOL/L (ref 22–31)
CREAT SERPL-MCNC: 0.7 MG/DL (ref 0.6–1.1)
DIASTOLIC BLOOD PRESSURE - MUSE: NORMAL MMHG
DIASTOLIC BLOOD PRESSURE - MUSE: NORMAL MMHG
ERYTHROCYTE [DISTWIDTH] IN BLOOD BY AUTOMATED COUNT: 12.8 % (ref 10–15)
GFR SERPL CREATININE-BSD FRML MDRD: >90 ML/MIN/1.73M2
GLUCOSE BLD-MCNC: 92 MG/DL (ref 70–125)
HCT VFR BLD AUTO: 37.1 % (ref 35–47)
HGB BLD-MCNC: 11.7 G/DL (ref 11.7–15.7)
INTERPRETATION ECG - MUSE: NORMAL
INTERPRETATION ECG - MUSE: NORMAL
MCH RBC QN AUTO: 26.6 PG (ref 26.5–33)
MCHC RBC AUTO-ENTMCNC: 31.5 G/DL (ref 31.5–36.5)
MCV RBC AUTO: 84 FL (ref 78–100)
P AXIS - MUSE: 26 DEGREES
P AXIS - MUSE: 69 DEGREES
PLATELET # BLD AUTO: 244 10E3/UL (ref 150–450)
POTASSIUM BLD-SCNC: 4 MMOL/L (ref 3.5–5)
PR INTERVAL - MUSE: 152 MS
PR INTERVAL - MUSE: 180 MS
QRS DURATION - MUSE: 88 MS
QRS DURATION - MUSE: 88 MS
QT - MUSE: 378 MS
QT - MUSE: 390 MS
QTC - MUSE: 429 MS
QTC - MUSE: 462 MS
R AXIS - MUSE: -54 DEGREES
R AXIS - MUSE: 237 DEGREES
RBC # BLD AUTO: 4.4 10E6/UL (ref 3.8–5.2)
SARS-COV-2 RNA RESP QL NAA+PROBE: NEGATIVE
SODIUM SERPL-SCNC: 143 MMOL/L (ref 136–145)
SYSTOLIC BLOOD PRESSURE - MUSE: NORMAL MMHG
SYSTOLIC BLOOD PRESSURE - MUSE: NORMAL MMHG
T AXIS - MUSE: 43 DEGREES
T AXIS - MUSE: 69 DEGREES
UPPER GI ENDOSCOPY: NORMAL
VENTRICULAR RATE- MUSE: 73 BPM
VENTRICULAR RATE- MUSE: 90 BPM
WBC # BLD AUTO: 4.3 10E3/UL (ref 4–11)

## 2022-04-26 PROCEDURE — 87635 SARS-COV-2 COVID-19 AMP PRB: CPT | Performed by: FAMILY MEDICINE

## 2022-04-26 PROCEDURE — 250N000011 HC RX IP 250 OP 636

## 2022-04-26 PROCEDURE — 272N000001 HC OR GENERAL SUPPLY STERILE: Performed by: INTERNAL MEDICINE

## 2022-04-26 PROCEDURE — 99238 HOSP IP/OBS DSCHRG MGMT 30/<: CPT | Mod: GC | Performed by: STUDENT IN AN ORGANIZED HEALTH CARE EDUCATION/TRAINING PROGRAM

## 2022-04-26 PROCEDURE — 36415 COLL VENOUS BLD VENIPUNCTURE: CPT

## 2022-04-26 PROCEDURE — 88305 TISSUE EXAM BY PATHOLOGIST: CPT | Mod: TC | Performed by: INTERNAL MEDICINE

## 2022-04-26 PROCEDURE — 370N000017 HC ANESTHESIA TECHNICAL FEE, PER MIN: Performed by: INTERNAL MEDICINE

## 2022-04-26 PROCEDURE — 96376 TX/PRO/DX INJ SAME DRUG ADON: CPT

## 2022-04-26 PROCEDURE — 88341 IMHCHEM/IMCYTCHM EA ADD ANTB: CPT | Mod: 26 | Performed by: PATHOLOGY

## 2022-04-26 PROCEDURE — 360N000075 HC SURGERY LEVEL 2, PER MIN: Performed by: INTERNAL MEDICINE

## 2022-04-26 PROCEDURE — 250N000009 HC RX 250: Performed by: ANESTHESIOLOGY

## 2022-04-26 PROCEDURE — 88305 TISSUE EXAM BY PATHOLOGIST: CPT | Mod: 26 | Performed by: PATHOLOGY

## 2022-04-26 PROCEDURE — 93005 ELECTROCARDIOGRAM TRACING: CPT

## 2022-04-26 PROCEDURE — 85027 COMPLETE CBC AUTOMATED: CPT

## 2022-04-26 PROCEDURE — 93010 ELECTROCARDIOGRAM REPORT: CPT | Performed by: INTERNAL MEDICINE

## 2022-04-26 PROCEDURE — C9113 INJ PANTOPRAZOLE SODIUM, VIA: HCPCS

## 2022-04-26 PROCEDURE — 258N000003 HC RX IP 258 OP 636

## 2022-04-26 PROCEDURE — 82310 ASSAY OF CALCIUM: CPT

## 2022-04-26 PROCEDURE — G0378 HOSPITAL OBSERVATION PER HR: HCPCS

## 2022-04-26 PROCEDURE — 258N000003 HC RX IP 258 OP 636: Performed by: ANESTHESIOLOGY

## 2022-04-26 PROCEDURE — 0DB68ZX EXCISION OF STOMACH, VIA NATURAL OR ARTIFICIAL OPENING ENDOSCOPIC, DIAGNOSTIC: ICD-10-PCS | Performed by: INTERNAL MEDICINE

## 2022-04-26 PROCEDURE — 88342 IMHCHEM/IMCYTCHM 1ST ANTB: CPT | Mod: 26 | Performed by: PATHOLOGY

## 2022-04-26 PROCEDURE — 250N000013 HC RX MED GY IP 250 OP 250 PS 637

## 2022-04-26 PROCEDURE — 250N000011 HC RX IP 250 OP 636: Performed by: NURSE ANESTHETIST, CERTIFIED REGISTERED

## 2022-04-26 PROCEDURE — 999N000141 HC STATISTIC PRE-PROCEDURE NURSING ASSESSMENT: Performed by: INTERNAL MEDICINE

## 2022-04-26 RX ORDER — LIDOCAINE 40 MG/G
CREAM TOPICAL
Status: DISCONTINUED | OUTPATIENT
Start: 2022-04-26 | End: 2022-04-26 | Stop reason: HOSPADM

## 2022-04-26 RX ORDER — FENTANYL CITRATE 50 UG/ML
25 INJECTION, SOLUTION INTRAMUSCULAR; INTRAVENOUS EVERY 5 MIN PRN
Status: CANCELLED | OUTPATIENT
Start: 2022-04-26

## 2022-04-26 RX ORDER — FLUMAZENIL 0.1 MG/ML
0.2 INJECTION, SOLUTION INTRAVENOUS
Status: DISCONTINUED | OUTPATIENT
Start: 2022-04-26 | End: 2022-04-26 | Stop reason: HOSPADM

## 2022-04-26 RX ORDER — SODIUM CHLORIDE, SODIUM LACTATE, POTASSIUM CHLORIDE, CALCIUM CHLORIDE 600; 310; 30; 20 MG/100ML; MG/100ML; MG/100ML; MG/100ML
INJECTION, SOLUTION INTRAVENOUS CONTINUOUS
Status: DISCONTINUED | OUTPATIENT
Start: 2022-04-26 | End: 2022-04-26 | Stop reason: HOSPADM

## 2022-04-26 RX ORDER — ONDANSETRON 2 MG/ML
4 INJECTION INTRAMUSCULAR; INTRAVENOUS EVERY 30 MIN PRN
Status: CANCELLED | OUTPATIENT
Start: 2022-04-26

## 2022-04-26 RX ORDER — DEXAMETHASONE SODIUM PHOSPHATE 4 MG/ML
INJECTION, SOLUTION INTRA-ARTICULAR; INTRALESIONAL; INTRAMUSCULAR; INTRAVENOUS; SOFT TISSUE PRN
Status: DISCONTINUED | OUTPATIENT
Start: 2022-04-26 | End: 2022-04-26

## 2022-04-26 RX ORDER — FENTANYL CITRATE 50 UG/ML
25 INJECTION, SOLUTION INTRAMUSCULAR; INTRAVENOUS
Status: CANCELLED | OUTPATIENT
Start: 2022-04-26

## 2022-04-26 RX ORDER — MEPERIDINE HYDROCHLORIDE 50 MG/ML
12.5 INJECTION INTRAMUSCULAR; INTRAVENOUS; SUBCUTANEOUS
Status: CANCELLED | OUTPATIENT
Start: 2022-04-26

## 2022-04-26 RX ORDER — ONDANSETRON 4 MG/1
4 TABLET, ORALLY DISINTEGRATING ORAL EVERY 30 MIN PRN
Status: CANCELLED | OUTPATIENT
Start: 2022-04-26

## 2022-04-26 RX ORDER — FENTANYL CITRATE 50 UG/ML
50 INJECTION, SOLUTION INTRAMUSCULAR; INTRAVENOUS
Status: DISCONTINUED | OUTPATIENT
Start: 2022-04-26 | End: 2022-04-26 | Stop reason: HOSPADM

## 2022-04-26 RX ORDER — HYDROMORPHONE HYDROCHLORIDE 1 MG/ML
0.5 INJECTION, SOLUTION INTRAMUSCULAR; INTRAVENOUS; SUBCUTANEOUS EVERY 5 MIN PRN
Status: CANCELLED | OUTPATIENT
Start: 2022-04-26

## 2022-04-26 RX ORDER — SODIUM CHLORIDE, SODIUM LACTATE, POTASSIUM CHLORIDE, CALCIUM CHLORIDE 600; 310; 30; 20 MG/100ML; MG/100ML; MG/100ML; MG/100ML
INJECTION, SOLUTION INTRAVENOUS CONTINUOUS
Status: CANCELLED | OUTPATIENT
Start: 2022-04-26

## 2022-04-26 RX ORDER — SCOLOPAMINE TRANSDERMAL SYSTEM 1 MG/1
1 PATCH, EXTENDED RELEASE TRANSDERMAL ONCE
Status: DISCONTINUED | OUTPATIENT
Start: 2022-04-26 | End: 2022-04-26 | Stop reason: HOSPADM

## 2022-04-26 RX ORDER — PANTOPRAZOLE SODIUM 40 MG/1
40 TABLET, DELAYED RELEASE ORAL 2 TIMES DAILY
Qty: 120 TABLET | Refills: 0 | Status: SHIPPED | OUTPATIENT
Start: 2022-04-26

## 2022-04-26 RX ORDER — PROPOFOL 10 MG/ML
INJECTION, EMULSION INTRAVENOUS CONTINUOUS PRN
Status: DISCONTINUED | OUTPATIENT
Start: 2022-04-26 | End: 2022-04-26

## 2022-04-26 RX ORDER — OXYCODONE HYDROCHLORIDE 5 MG/1
5 TABLET ORAL EVERY 4 HOURS PRN
Status: CANCELLED | OUTPATIENT
Start: 2022-04-26

## 2022-04-26 RX ADMIN — SODIUM CHLORIDE, POTASSIUM CHLORIDE, SODIUM LACTATE AND CALCIUM CHLORIDE: 600; 310; 30; 20 INJECTION, SOLUTION INTRAVENOUS at 11:14

## 2022-04-26 RX ADMIN — ONDANSETRON 4 MG: 2 INJECTION INTRAMUSCULAR; INTRAVENOUS at 11:21

## 2022-04-26 RX ADMIN — AMLODIPINE BESYLATE 5 MG: 5 TABLET ORAL at 08:08

## 2022-04-26 RX ADMIN — LIDOCAINE HYDROCHLORIDE 20 MG: 10 INJECTION, SOLUTION INFILTRATION; PERINEURAL at 11:20

## 2022-04-26 RX ADMIN — SODIUM CHLORIDE: 9 INJECTION, SOLUTION INTRAVENOUS at 06:41

## 2022-04-26 RX ADMIN — PANTOPRAZOLE SODIUM 40 MG: 40 INJECTION, POWDER, FOR SOLUTION INTRAVENOUS at 08:08

## 2022-04-26 RX ADMIN — DEXAMETHASONE SODIUM PHOSPHATE 4 MG: 4 INJECTION, SOLUTION INTRA-ARTICULAR; INTRALESIONAL; INTRAMUSCULAR; INTRAVENOUS; SOFT TISSUE at 11:21

## 2022-04-26 RX ADMIN — SCOPALAMINE 1 PATCH: 1 PATCH, EXTENDED RELEASE TRANSDERMAL at 11:10

## 2022-04-26 RX ADMIN — SODIUM CHLORIDE: 9 INJECTION, SOLUTION INTRAVENOUS at 13:31

## 2022-04-26 RX ADMIN — PROPOFOL 200 MCG/KG/MIN: 10 INJECTION, EMULSION INTRAVENOUS at 11:18

## 2022-04-26 ASSESSMENT — ACTIVITIES OF DAILY LIVING (ADL)
ADLS_ACUITY_SCORE: 4

## 2022-04-26 NOTE — ANESTHESIA CARE TRANSFER NOTE
Patient: Norah Guzman    Procedure: Procedure(s):  ESOPHAGOGASTRODUODENOSCOPY (EGD) WITH BIOPSY       Diagnosis: Abdominal pain, epigastric [R10.13]  Diagnosis Additional Information: No value filed.    Anesthesia Type:   MAC     Note:    Oropharynx: oropharynx clear of all foreign objects and spontaneously breathing  Level of Consciousness: awake  Oxygen Supplementation: room air    Independent Airway: airway patency satisfactory and stable  Dentition: dentition unchanged  Vital Signs Stable: post-procedure vital signs reviewed and stable  Report to RN Given: handoff report given  Patient transferred to: Medical/Surgical Unit    Handoff Report: Identifed the Patient, Identified the Reponsible Provider, Reviewed the pertinent medical history, Discussed the surgical course, Reviewed Intra-OP anesthesia mangement and issues during anesthesia, Set expectations for post-procedure period and Allowed opportunity for questions and acknowledgement of understanding      Vitals:  Vitals Value Taken Time   /81 04/26/22 1140   Temp 36.9  C (98.4  F) 04/26/22 1140   Pulse 87 04/26/22 1140   Resp 16 04/26/22 1140   SpO2 99 % 04/26/22 1140       Electronically Signed By: JOCELYN Renteria CRNA  April 26, 2022  11:43 AM

## 2022-04-26 NOTE — ANESTHESIA POSTPROCEDURE EVALUATION
Patient: Norah Guzman    Procedure: Procedure(s):  ESOPHAGOGASTRODUODENOSCOPY (EGD) WITH BIOPSY       Anesthesia Type:  MAC    Note:  Disposition: Outpatient   Postop Pain Control: Uneventful            Sign Out: Well controlled pain   PONV: No   Neuro/Psych: Uneventful            Sign Out: Acceptable/Baseline neuro status   Airway/Respiratory: Uneventful            Sign Out: Acceptable/Baseline resp. status   CV/Hemodynamics: Uneventful            Sign Out: Acceptable CV status; No obvious hypovolemia; No obvious fluid overload   Other NRE: NONE   DID A NON-ROUTINE EVENT OCCUR? No           Last vitals:  Vitals Value Taken Time   /81 04/26/22 1140   Temp 36.9  C (98.4  F) 04/26/22 1140   Pulse 87 04/26/22 1140   Resp 16 04/26/22 1140   SpO2 99 % 04/26/22 1140       Electronically Signed By: Estephania Kelly MD  April 26, 2022  11:53 AM

## 2022-04-26 NOTE — INTERVAL H&P NOTE
"I have reviewed the surgical (or preoperative) H&P that is linked to this encounter, and examined the patient. There are no significant changes    Clinical Conditions Present on Arrival:  Clinically Significant Risk Factors Present on Admission                  # Platelet Defect: home medication list includes an antiplatelet medication  # Overweight: Estimated body mass index is 26.19 kg/m  as calculated from the following:    Height as of this encounter: 1.575 m (5' 2\").    Weight as of this encounter: 65 kg (143 lb 3.2 oz).       "

## 2022-04-26 NOTE — DISCHARGE SUMMARY
"[facility]  Discharge Summary - Medicine & Pediatrics       Date of Admission:  4/25/2022  Date of Discharge:  4/26/2022  Discharging Provider: Sarah Modi MD PGY2  Discharge Service: Hospitalist Service    Discharge Diagnoses   Gastric ulcers    Follow-ups Needed After Discharge   Please follow up pathology results and ensure patient is not taking NSAIDs    Unresulted Labs Ordered in the Past 30 Days of this Admission     Date and Time Order Name Status Description    4/26/2022 11:24 AM Surgical Pathology Exam In process       These results will be followed up by PCP and GI    Discharge Disposition   Discharged to home  Condition at discharge: Stable    Hospital Course   Norah Guzman was admitted on 4/25/2022 for symptomatic peptic ulcers.  The following problems were addressed during her hospitalization:     Gastric ulcers causing abdominal pain, Improved  Presented with 2 days of acute abdominal pain, and dark emesis x 1. Has extensive history of NSAID use over several years, but increased in the past 3 months following hip surgery. During hospitalization, GI was consulted and completed EGD, which showed:    \"A small hiatal hernia was present.   The exam of the esophagus was otherwise normal.   Few non-bleeding cratered gastric ulcers with a clean ulcer base   (Michael Class III) were found in the gastric antrum. The largest lesion   was 8 mm in largest dimension and had somewhat heaped up mucosal edge.   Biopsies were taken from this ulcer edge with a cold forceps for   histology. Estimated blood loss was minimal.   Additional biopsies were obtained using cold biopsy forceps from normal appearing background gastric mucosa for H.pylori testing.   The examined duodenum was normal.\"    Per GI recs:  - Avoid NSAIDS  - PPI 40mg BID 2 months, then daily after  - Repeat EGD in 2 months  - Follow up surgical pathology results     HTN  She was noted to be hypertensive to 140s during " admission  - resume PTA amlodipine.      Chronic Medical Conditions:  GERD: PO Protonix as above  Asthma: resume PTA regimen  Seasonal Allergies: resume PTA regimen       Consultations This Hospital Stay   GASTROENTEROLOGY IP CONSULT    Code Status   Full Code       The patient was discussed with Dr. Lilli Modi MD PGY2  Teaching Service  [address] [address]  Phone: [phone]  Fax: [phone]  ______________________________________________________________________    Physical Exam   Vital Signs: Temp: 98.4  F (36.9  C) Temp src: Temporal BP: (!) 149/81 Pulse: 87   Resp: 16 SpO2: 99 % O2 Device: None (Room air)    Weight: 143 lbs 3.2 oz  Constitutional: awake, alert, cooperative, no apparent distress, and appears stated age  Eyes: Lids and lashes normal, pupils equal, round and reactive to light, extra ocular muscles intact, sclera clear, conjunctiva normal  Respiratory: No increased work of breathing, good air exchange, clear to auscultation bilaterally, no crackles or wheezing  Cardiovascular: Normal apical impulse, regular rate and rhythm, normal S1 and S2, no S3 or S4, and no murmur noted  GI: No scars, normal bowel sounds, soft, non-distended, epigastric tenderness to palpation, no masses palpated, no hepatosplenomegally  Skin: no bruising or bleeding, normal skin color, texture, turgor and no redness, warmth, or swelling  Musculoskeletal: There is no redness, warmth, or swelling of the joints.   Neurologic: Awake, alert, oriented to name, place and time.  Cranial nerves II-XII are grossly intact.  Neuropsychiatric: General: normal, calm and normal eye contact  Level of consciousness: alert / normal  Memory and insight: normal, memory for past and recent events intact and thought process normal      Primary Care Physician   Yehuda Jonas    Discharge Orders   No discharge procedures on file.    Significant  Results and Procedures   Results for orders placed or performed during the hospital encounter of 04/25/22   Abd/pelvis CT no contrast - Stone Protocol    Narrative    EXAM: CT ABDOMEN PELVIS W/O CONTRAST  LOCATION: [facility]  DATE/TIME: 4/25/2022 12:27 PM    INDICATION: Abdominal distension  COMPARISON: None.  TECHNIQUE: CT scan of the abdomen and pelvis was performed without IV contrast. Multiplanar reformats were obtained. Dose reduction techniques were used.  CONTRAST: None.    FINDINGS:   LOWER CHEST: Normal.    HEPATOBILIARY: Several circumscribed fluid attenuation lesions are present in the liver, several of which are subcentimeter and difficult to definitively characterize but all typical for benign cysts. Liver is normal in size and has a smooth capsule. No   calcified gallstones or gallbladder distention. No bile duct enlargement.    PANCREAS: Normal.    SPLEEN: Normal.    ADRENAL GLANDS: Normal.    KIDNEYS/BLADDER: Kidneys are symmetric and normal in size. No nephrolithiasis or hydronephrosis. Ureters are normal caliber. Normal urinary bladder.    BOWEL: Localized wall thickening of the distal stomach (series 5, image 18) measuring just under 5 cm in length. No inflammatory stranding in the fat around the distal stomach. Appendix not definitively identified. No pericecal inflammation normal   colonic stool burden. No bowel wall thickening or inflammatory stranding in the mesentery.    LYMPH NODES: Normal.    VASCULATURE: Unremarkable.    PELVIC ORGANS: Uterus is normal in size. No pelvic mass or free fluid.    MUSCULOSKELETAL: Status post left hip replacement. Normal alignment of the prosthesis and no periprosthetic fracture. Benign subchondral cystic change in the anterior right acetabulum. Disc space narrowing of the lumbar spine greatest at L1-L2. No   aggressive or destructive bone lesions are present.      Impression    IMPRESSION:     Focal wall thickening of the distal  stomach. This could relate to a distal gastric ulcer or ulcerated mass. GI consultation for potential upper endoscopy is suggested.           Discharge Medications   Current Discharge Medication List      CONTINUE these medications which have NOT CHANGED    Details   amLODIPine (NORVASC) 5 MG tablet Take 5 mg by mouth daily      aspirin (ASA) 81 MG EC tablet Take 81 mg by mouth At Bedtime      calcium carbonate (TUMS) 500 MG chewable tablet Take 2 chew tab by mouth At Bedtime      cetirizine (ZYRTEC) 10 MG tablet Take 10 mg by mouth At Bedtime      fluticasone (FLONASE) 50 MCG/ACT spray Spray 1 spray into both nostrils daily      melatonin 3 MG tablet Take 3 mg by mouth At Bedtime           Allergies   Allergies   Allergen Reactions     Fenoprofen Hives     Codeine Nausea and Vomiting     Hydrocodone-Acetaminophen Nausea and Vomiting     Penicillins Unknown and Other (See Comments)     Pt doesn't remember type of reaction  Pt doesn't remember type of reaction       Sulfa Drugs Unknown     Pt doesn't remember type of reaction     Venlafaxine Other (See Comments)     Pt seen in ER for HTN, tachycardia, etc.   Made all vital sign go very high, Went to Er

## 2022-04-26 NOTE — PROGRESS NOTES
Care Management Follow Up    SW reviewed chart.  Pt from home, independent at baseline.  No CM needs identified at this time. CM following care progression to assist as needed.      NINA Rangel

## 2022-04-26 NOTE — ANESTHESIA PREPROCEDURE EVALUATION
Anesthesia Pre-Procedure Evaluation    Patient: Norah Guzman   MRN: [id number] : 1965        Procedure : Procedure(s):  ESOPHAGOGASTRODUODENOSCOPY (EGD)          Past Medical History:   Diagnosis Date     Cervical high risk HPV (human papillomavirus) test positive 2018: see problem list.      Essential hypertension       Past Surgical History:   Procedure Laterality Date     APPENDECTOMY       ARTHROSCOPY KNEE Right 2005     BIOPSY CERVICAL, LOCAL EXCISION, SINGLE/MULTIPLE       BREAST BIOPSY, RT/LT Right      ENDOMETRIAL ABLATION       ENDOMETRIAL ABLATION  2006     KNEE ARTHROSCOPY W/ DEBRIDEMENT Right      MAMMOPLASTY REDUCTION BILATERAL       TUBAL LIGATION       TUBAL LIGATION        Allergies   Allergen Reactions     Fenoprofen Hives     Codeine Nausea and Vomiting     Hydrocodone-Acetaminophen Nausea and Vomiting     Penicillins Unknown and Other (See Comments)     Pt doesn't remember type of reaction  Pt doesn't remember type of reaction       Sulfa Drugs Unknown     Pt doesn't remember type of reaction     Venlafaxine Other (See Comments)     Pt seen in ER for HTN, tachycardia, etc.   Made all vital sign go very high, Went to Er        Social History     Tobacco Use     Smoking status: Former Smoker     Start date:      Quit date: 2018     Years since quittin.3     Smokeless tobacco: Never Used   Substance Use Topics     Alcohol use: Yes     Comment: one per night      Wt Readings from Last 1 Encounters:   22 65 kg (143 lb 3.2 oz)        Anesthesia Evaluation   Pt has had prior anesthetic.     History of anesthetic complications  - PONV.      ROS/MED HX  ENT/Pulmonary:       Neurologic:       Cardiovascular:     (+) hypertension-----    METS/Exercise Tolerance:     Hematologic:       Musculoskeletal:       GI/Hepatic:       Renal/Genitourinary:       Endo:       Psychiatric/Substance Use:       Infectious Disease:       Malignancy:       Other:             Physical Exam    Airway        Mallampati: II    Neck ROM: full     Respiratory Devices and Support         Dental  no notable dental history         Cardiovascular   cardiovascular exam normal          Pulmonary   pulmonary exam normal                OUTSIDE LABS:  CBC:   Lab Results   Component Value Date    WBC 4.3 04/26/2022    WBC 7.1 04/25/2022    HGB 11.7 04/26/2022    HGB 13.9 04/25/2022    HCT 37.1 04/26/2022    HCT 43.8 04/25/2022     04/26/2022     04/25/2022     BMP:   Lab Results   Component Value Date     04/26/2022     04/25/2022    POTASSIUM 4.0 04/26/2022    POTASSIUM 4.0 04/25/2022    CHLORIDE 109 (H) 04/26/2022    CHLORIDE 102 04/25/2022    CO2 27 04/26/2022    CO2 26 04/25/2022    BUN 6 (L) 04/26/2022    BUN 10 04/25/2022    CR 0.70 04/26/2022    CR 0.83 04/25/2022    GLC 92 04/26/2022     04/25/2022     COAGS:   Lab Results   Component Value Date    PTT 25 04/25/2022    INR 1.01 04/25/2022     POC: No results found for: BGM, HCG, HCGS  HEPATIC:   Lab Results   Component Value Date    ALBUMIN 3.8 04/25/2022    PROTTOTAL 7.3 04/25/2022    ALT 12 04/25/2022    AST 15 04/25/2022    ALKPHOS 118 04/25/2022    BILITOTAL 1.4 (H) 04/25/2022     OTHER:   Lab Results   Component Value Date    JERSON 8.8 04/26/2022    LIPASE 49 04/25/2022       Anesthesia Plan    ASA Status:  2      Anesthesia Type: MAC.     - Reason for MAC: straight local not clinically adequate              Consents    Anesthesia Plan(s) and associated risks, benefits, and realistic alternatives discussed. Questions answered and patient/representative(s) expressed understanding.     - Discussed: Risks, Benefits and Alternatives for the PROCEDURE were discussed     - Discussed with:  Patient      - Extended Intubation/Ventilatory Support Discussed: No.      - Patient is DNR/DNI Status: No    Use of blood products discussed: No .     Postoperative Care    Pain management: Multi-modal analgesia.         Comments:                Estephania Kelly MD

## 2022-04-26 NOTE — UTILIZATION REVIEW
"Admission Status; Secondary Review Determination   POST DISCHARGE REVIEW    Under the authority of the Utilization Management Committee, the utilization review process indicated a secondary review on the above patient.  The review outcome is based on review of the medical records, discussions with staff, and applying clinical experience noted on the date of the review.          (x) Observation Status Appropriate - This patient does not meet hospital inpatient criteria and is placed in observation status. If this patient's primary payer is Medicare and was admitted as an inpatient, Condition Code 44 should be used and patient status changed to \"observation\".       RATIONALE FOR DETERMINATION     Ms. Guzman is 57 yo female who presented with abd pain and dark emesis PTA.  Vitals and hemoglobin remained stable; no blood transfusion indicated  GI was consulted; underwent EGD with findings of a few, non-bleeding gastric ulcers in the antral area.   She was able to tolerate a diet post-procedure and will be discharging home later today with outpt f/up.    Ms. Guzman has been discharged and left the hospital at the time of this review.  This should be considered a POST-DISCHARGE review.     The severity of illness, intensity of service provided, expected LOS and risk for adverse outcome make the care appropriate for further observation; however, doesn't meet criteria for hospital inpatient admission.      The information on this document is developed by the utilization review team in order for the business office to ensure compliance.  This only denotes the appropriateness of proper admission status and does not reflect the quality of care rendered.         The definitions of Inpatient Status and Observation Status used in making the determination above are those provided in the CMS Coverage Manual, Chapter 1 and Chapter 6, section 70.4.        Sincerely,    Alis Kirby, DO  Utilization Review  Physician " Advisor  [facility]

## 2022-04-26 NOTE — PLAN OF CARE
Problem: Pain Acute  Goal: Acceptable Pain Control and Functional Ability  Outcome: Ongoing, Progressing  Intervention: Develop Pain Management Plan  Recent Flowsheet Documentation  Taken 4/25/2022 2300 by Savanah Ramírez RN  Pain Management Interventions: medication (see MAR)   Goal Outcome Evaluation:    Patient is alert and orientated times four. Rated pain 2-3/10. Declined any pain medication intervention. Patient has denied having any N/V; therefore, still need gastric occult sample. Has been NPO since 0000 for endoscopy today. IV fluids infusing at 125ml/hr. Up in room independently. Able to communicate needs, uses call light appropriately.

## 2022-04-26 NOTE — CONSULTS
[facility] Consultation     Norah Guzman  [address] [place] [address]  56 year old female     Admission Date/Time: 4/25/2022  Primary Care Provider: Yehuda Jonas     We were asked to see the patient in consultation by Dr. Esposito for evaluation of anemia, n/v, hematemesis.    ASSESSMENT:    Norah Guzman is a 56 year old female with PMH of HTN, GERD, IBS who was admitted on 4/25/22 for abdominal pains, nausea/vomiting with dark emesis.     1. Epigastric pains  2. Nausea/vomiting  3. Dark emesis  - Seems to be improving mildly today  - High suspicion for PUD given her history of NSAID use. She has no prior history of symptoms. Recently started using more NSAIDs due to hip arthroplasty in Jan 2022.      RECOMMENDATIONS:  - Plan for EGD today  - Continue PPI therapy, likely will need to remain on this for 2+ months  - Will need to minimize NSAID use or stop completely  - More recommendations to follow EGD     Case discussed with Dr. Grey, please review MD addendum below.    Approximately 40 minutes of total time was spent providing patient care, including patient evaluation, reviewing documentation/test results, and     Charles Herzog PA-C  [facility]  [phone]  ________________________________________________________________________        CC: epigastric pains, nausea, vomiting, black emesis     HPI:  Norah Guzman is a 56 year old female with PMH of HTN, GERD, IBS who was admitted on 4/25/22 for abdominal pains, nausea/vomiting with dark emesis.      Pt reports developing severe epigastric abdominal pains on Sunday (2 days ago) with associated nausea, vomiting, and decreased appetite. Emesis was noted to be black in color. No fresh blood or coffeeground appearance. Pain was rated 8/10, in the epigastrium without radiation elsewhere, persistent for the past 2 days with only mild relief beginning last night. Pt has history of off-and-on NSAID use since  having left hip arthroplasty in Jan 2022. Day prior to onset of symptoms, she took ibuprofen 800mg. She also has history of NSAID use in the past for neck/back pains. She denies prior history of EGD. No prior history of current symptoms.     She has a long history of GERD ever since she was a child. She manages this with with antacids and elevating head of the bed at night. Occasionally she will use Pepcid when traveling.     She mentions family history of colon cancer in her father and secondary relatives on both sides of the family. She reports she is up to date with her colonoscopies which she started in her 40's.  She believes she is due next year for colon cancer screening.     In the hospital:  Hgb 13.9 -> 11.7, BMP unremarkable, hepatic panel unremarkable except for bili 1.4. Lipase normal. INR 1.01. CT with wall thickening of distal stomach but otherwise normal.       ROS: A comprehensive ten point review of systems was negative aside from those in mentioned in the HPI.      PAST MEDICAL HISTORY:  Patient Active Problem List    Diagnosis Date Noted     Abdominal pain, epigastric 04/25/2022     Priority: Medium     Acute appendicitis 07/23/2020     Priority: Medium     IBS (irritable bowel syndrome) 07/23/2020     Priority: Medium     Macromastia 10/31/2018     Priority: Medium     Formatting of this note might be different from the original.  Added automatically from request for surgery [id number]       Cervical high risk HPV (human papillomavirus) test positive 07/05/2018     Priority: Medium     Hx of LEEP, no records to review.   12/17/15 NIL Pap, Neg HR HPV  04/18/17 NIL Pap, Neg HR HPV  07/05/18: NIL pap, + HR HPV (not 16 or 18) result. Plan cotest in 1 year.   8/22/19 NIL, Neg HR HPV. Plan per provider, Patient wants to do q1 year for now due to h/o LEEP.   05/11/21 NIL Pap, Neg HR HPV. Plan cotest in 1 year due hx of LEEP.        Allergic conjunctivitis, bilateral 11/14/2017     Priority: Medium      Allergic rhinitis due to allergen 2017     Priority: Medium     Formatting of this note might be different from the original.  Skin Prick Testing was performed on: 2017   Sensitive to: Mice, Trees, Weeds, Ragweed,  Dust Mites, Cockroach & Pullularia       Osteopenia 2017     Priority: Medium     Anxiety 2015     Priority: Medium     Neck pain, chronic 10/06/2014     Priority: Medium     Formatting of this note might be different from the original.  Hx of injections. Now using heat, massage and NSAIDs       Major depression, recurrent, chronic (H) 10/22/2013     Priority: Medium     SOCIAL HISTORY:  Social History     Tobacco Use     Smoking status: Former Smoker     Start date:      Quit date: 2018     Years since quittin.3     Smokeless tobacco: Never Used   Substance Use Topics     Alcohol use: Yes     Comment: one per night     Drug use: No     FAMILY HISTORY:  Family History   Problem Relation Age of Onset     Other - See Comments Mother         bone loss     Hypertension Mother      Colon Cancer Father      Other - See Comments Father         bone loss      Other - See Comments Sister         bone loss      Hypertension Sister      Colon Cancer Maternal Grandfather      Breast Cancer Other         maternal great aunt      Colon Cancer Paternal Uncle      Other - See Comments Sister         bone loss     Hypertension Sister      Uterine Cancer No family hx of      Ovarian Cancer No family hx of      Osteoporosis Mother      Osteoporosis Father      Alzheimer Disease Maternal Grandfather      Colon Cancer Paternal Grandfather      Colon Cancer Paternal Uncle      ALLERGIES:   Allergies   Allergen Reactions     Fenoprofen Hives     Codeine Nausea and Vomiting     Hydrocodone-Acetaminophen Nausea and Vomiting     Penicillins Unknown and Other (See Comments)     Pt doesn't remember type of reaction  Pt doesn't remember type of reaction       Sulfa Drugs Unknown     Pt doesn't  "remember type of reaction     Venlafaxine Other (See Comments)     Pt seen in ER for HTN, tachycardia, etc.   Made all vital sign go very high, Went to Er       MEDICATIONS:   Current Facility-Administered Medications   Medication     acetaminophen (TYLENOL) tablet 650 mg    Or     acetaminophen (TYLENOL) Suppository 650 mg     amLODIPine (NORVASC) tablet 5 mg     lidocaine (LMX4) cream     lidocaine 1 % 0.1-1 mL     morphine (PF) injection 2 mg     naloxone (NARCAN) injection 0.2 mg    Or     naloxone (NARCAN) injection 0.4 mg    Or     naloxone (NARCAN) injection 0.2 mg    Or     naloxone (NARCAN) injection 0.4 mg     ondansetron (ZOFRAN-ODT) ODT tab 4 mg    Or     ondansetron (ZOFRAN) injection 4 mg     pantoprazole (PROTONIX) IV push injection 40 mg     sodium chloride (PF) 0.9% PF flush 3 mL     sodium chloride (PF) 0.9% PF flush 3 mL     sodium chloride 0.9% infusion       PHYSICAL EXAM:   /78 (BP Location: Right arm, Patient Position: Semi-Booth's, Cuff Size: Adult Large)   Pulse 80   Temp 97.6  F (36.4  C) (Oral)   Resp 12   Ht 1.575 m (5' 2\")   Wt 65 kg (143 lb 3.2 oz)   LMP 03/01/2019 (Approximate)   SpO2 98%   BMI 26.19 kg/m     GEN: Alert, oriented x3, communicative and in NAD.  RADHA: AT, anicteric, OP without erythema, exudate, or ulcers.    NECK: Supple.    LYMPH: No LAD noted.  HRT: RRR  LUNGS: CTA  ABD:  ND, +BS, +epigastric pain to palpation, no rebound, no HSM.  SKIN: No rash, jaundice or spider angiomata  MSKL: LE free of edema, strength 5/5 all 4 extrems  NEURO: CN grossly intact, sensation intact to light touch, toes downgoing.     ADDITIONAL DATA:   I reviewed the patient's new clinical lab test results.   Recent Labs   Lab Test 04/26/22  0625 04/25/22  1212   WBC 4.3 7.1   HGB 11.7 13.9   MCV 84 83    313   INR  --  1.01     Recent Labs   Lab Test 04/26/22  0625 04/25/22  1212   POTASSIUM 4.0 4.0   CHLORIDE 109* 102   CO2 27 26   BUN 6* 10   CR 0.70 0.83   ANIONGAP 7 12 "     Recent Labs   Lab Test 04/25/22  1212   ALBUMIN 3.8   BILITOTAL 1.4*   ALT 12   AST 15   LIPASE 49        IMAGING:  I reviewed the patient's new imaging results.           GI ATTENDING BRIEF ADDENDUM:  The patient was seen and examined.  Discussed with Charles Herzog PA-C.  Agree with findings and plan as above with the following addendum.     57 yo F with epigastric pain and coffee ground emesis in the setting of NSAID use. CT with gastric wall thickening. DDx including gastritis, PUD, neoplasm etc. Plan: EGD today. Discontinue NSAID use, alcohol, smoking. Continue PPI.    MASSIEL Ponce  [facility]    Total visit time = 18 minutes; more than 50% spent counseling/coordinating care.

## 2022-04-26 NOTE — PLAN OF CARE
Problem: Pain Acute  Goal: Acceptable Pain Control and Functional Ability  Outcome: Ongoing, Progressing     Problem: Plan of Care - These are the overarching goals to be used throughout the patient stay.    Goal: Readiness for Transition of Care  Outcome: Ongoing, Progressing   Goal Outcome Evaluation:             Patient stated no abdominal pain this morning. BP within normal range 138/78 at 7:30 am and 127/81 10:30 am. Patient ambulated without assistance prior to endoscopy. Bed alarm in place after procedure. NPO prior to procedure now clear liquid diet advance as tolerated.

## 2022-04-26 NOTE — PROGRESS NOTES
[facility]    Progress Note - Hospitalist Service       Date of Admission:  4/25/2022    Assessment & Plan          Norah Guzman is a 56 year old female with a past medical history of HTN, Asthma, GERD, IBS who presented to the [facility] Emergency Department on the day of admission with complaints of nausea, black emesis, and abdominal pain, concerning for upper GI bleed.      Abdominal Pain, Improved  Concern for Upper GI Bleed  Gastric ulcer vs ulcerated mass on CT  Presented with 2 days of acute abdominal pain, and dark emesis x 1. Has extensive history of NSAID use over several years, but increased in the past 3 months following hip surgery. Hgb stable at 13 on admission. Likely NSAID induced gastritis/ulcer. Type and screen completed in ED. CT of abdomen/pelvis showing: Focal wall thickening of the distal stomach. This could relate to a distal gastric ulcer or ulcerated mass.   - Consult GI, appreciate recs   - EGD today  - IV Protonix 40 mg BID Q12  -  ml/hr   - IV Zofran/Morphine PRN  - Avoid NSAIDs  - Hold PTA ASA, Ibuprofen, Naproxen     HTN  Hypertensive on arrival to 143/109. Likely elevated 2/2 pain. EKG 4/25 showed possible T waves.   - Continue PTA Amlodipine  - EKG repeat today     Chronic Medical Conditions:  GERD: IV Protonix as above. Hold PTA TUMS  Asthma: Monitor. Patient not using Albuterol in many years  Seasonal Allergies: Hold PTA Cetrizine, Flonase      Diet: NPO per Anesthesia Guidelines for Procedure/Surgery Except for: No Exceptions    DVT Prophylaxis: Pneumatic Compression Devices  Plaza Catheter: Not present  Fluids: NPO/IVF  Central Lines: None  Cardiac Monitoring: None  Code Status: Full Code      Disposition Plan   Expected Discharge: Likely later today  Anticipated discharge location:  Awaiting care coordination huddle  Delays: Likely discharge after EGD       The patient's care was discussed with the Attending Physician, Dr. Jarrod Reeder and  Patient.    Aleta Esposito MD  Hospitalist Service  [facility]  ______________________________________________________________________    Interval History   Feeling better. Abdominal pain improved. Waiting on EGD at 1145.     Data reviewed today: I reviewed all medications, new labs and imaging results over the last 24 hours.    Physical Exam   Vital Signs: Temp: 97.6  F (36.4  C) Temp src: Oral BP: 138/78 Pulse: 80   Resp: 12 SpO2: 98 % O2 Device: None (Room air)    Weight: 143 lbs 3.2 oz  General Appearance: Alert, appears comfortable  Respiratory: CTA  Cardiovascular: Regular rate, and rhythm  GI: Soft, tender to palpation but improved    Data   Recent Labs   Lab 04/26/22  0625 04/25/22  1212   WBC 4.3 7.1   HGB 11.7 13.9   MCV 84 83    313   INR  --  1.01    140   POTASSIUM 4.0 4.0   CHLORIDE 109* 102   CO2 27 26   BUN 6* 10   CR 0.70 0.83   ANIONGAP 7 12   JERSON 8.8 9.8   GLC 92 104   ALBUMIN  --  3.8   PROTTOTAL  --  7.3   BILITOTAL  --  1.4*   ALKPHOS  --  118   ALT  --  12   AST  --  15   LIPASE  --  49     Recent Results (from the past 24 hour(s))   Abd/pelvis CT no contrast - Stone Protocol    Narrative    EXAM: CT ABDOMEN PELVIS W/O CONTRAST  LOCATION: [facility]  DATE/TIME: 4/25/2022 12:27 PM    INDICATION: Abdominal distension  COMPARISON: None.  TECHNIQUE: CT scan of the abdomen and pelvis was performed without IV contrast. Multiplanar reformats were obtained. Dose reduction techniques were used.  CONTRAST: None.    FINDINGS:   LOWER CHEST: Normal.    HEPATOBILIARY: Several circumscribed fluid attenuation lesions are present in the liver, several of which are subcentimeter and difficult to definitively characterize but all typical for benign cysts. Liver is normal in size and has a smooth capsule. No   calcified gallstones or gallbladder distention. No bile duct enlargement.    PANCREAS: Normal.    SPLEEN: Normal.    ADRENAL GLANDS:  Normal.    KIDNEYS/BLADDER: Kidneys are symmetric and normal in size. No nephrolithiasis or hydronephrosis. Ureters are normal caliber. Normal urinary bladder.    BOWEL: Localized wall thickening of the distal stomach (series 5, image 18) measuring just under 5 cm in length. No inflammatory stranding in the fat around the distal stomach. Appendix not definitively identified. No pericecal inflammation normal   colonic stool burden. No bowel wall thickening or inflammatory stranding in the mesentery.    LYMPH NODES: Normal.    VASCULATURE: Unremarkable.    PELVIC ORGANS: Uterus is normal in size. No pelvic mass or free fluid.    MUSCULOSKELETAL: Status post left hip replacement. Normal alignment of the prosthesis and no periprosthetic fracture. Benign subchondral cystic change in the anterior right acetabulum. Disc space narrowing of the lumbar spine greatest at L1-L2. No   aggressive or destructive bone lesions are present.      Impression    IMPRESSION:     Focal wall thickening of the distal stomach. This could relate to a distal gastric ulcer or ulcerated mass. GI consultation for potential upper endoscopy is suggested.

## 2022-04-26 NOTE — PLAN OF CARE
Pt transported to [facility]. Report given to SANDRA Mariscal. Pt stable at time of transfer. Pre-Op checklist completed

## 2022-04-26 NOTE — PLAN OF CARE
Goal Outcome Evaluation:  Pt complains of headache, PRN tylenol and PRN morphine given for head pain, with moderate effect. Pt tolerating her clear liquid diet, with no nausea, or increased pain. IV fluids running. Plans to be NPO at midnight for endoscopy tomorrow.

## 2022-04-27 ENCOUNTER — PATIENT OUTREACH (OUTPATIENT)
Dept: CARE COORDINATION | Facility: CLINIC | Age: 57
End: 2022-04-27
Payer: COMMERCIAL

## 2022-04-27 DIAGNOSIS — Z71.89 OTHER SPECIFIED COUNSELING: ICD-10-CM

## 2022-04-27 LAB
PATH REPORT.COMMENTS IMP SPEC: NORMAL
PATH REPORT.FINAL DX SPEC: NORMAL
PATH REPORT.GROSS SPEC: NORMAL
PATH REPORT.MICROSCOPIC SPEC OTHER STN: NORMAL
PATH REPORT.RELEVANT HX SPEC: NORMAL
PHOTO IMAGE: NORMAL

## 2022-04-27 NOTE — PROGRESS NOTES
Clinic Care Coordination Contact  [facility]/Voicemail       Clinical Data: Care Coordinator Outreach  Outreach attempted x 1.  Left message on patient's voicemail with call back information and requested return call.  Plan: Care Coordinator will try to reach patient again in 1-2 business days.    RACHEAL Stewart  [phone]  [facility]

## 2022-04-28 ENCOUNTER — PATIENT OUTREACH (OUTPATIENT)
Dept: FAMILY MEDICINE | Facility: CLINIC | Age: 57
End: 2022-04-28
Payer: COMMERCIAL

## 2022-04-28 DIAGNOSIS — R87.810 CERVICAL HIGH RISK HPV (HUMAN PAPILLOMAVIRUS) TEST POSITIVE: Primary | ICD-10-CM

## 2022-04-28 NOTE — PROGRESS NOTES
Clinic Care Coordination Contact  [facility]: Post-Discharge Note  SITUATION                                                      Admission:    Admission Date: 04/25/22   Reason for Admission: Gastric ulcers  Discharge:   Discharge Date: 04/26/22  Discharge Diagnosis: Gastric ulcers    BACKGROUND                                                      Per hospital discharge summary and inpatient provider notes:    Norah Guzman is a 56 year old old female with a past medical history of HTN, GERD, Asthma, IBS who presented to the [facility] Emergency Department on the day of admission with complaints of nausea, dark emesis, and abdominal pain.      Patient reports that she began noticing increased abdominal pain about 2 days ago and it has progressed to constant 8 out of 10 pain.  She describes the pain as burning constant severe, causes her to remain hunched over. She also began began having nausea at this time and last night she had 3 episodes of vomiting, noting that her emesis was black or dark in color.  She attempted to take left over antinausea medication without relief.  She is also noticed poor appetite.  She contacted her PCP this morning but was unable to get an appointment for more than 2days so after speaking with nurse triage she presented to [facility] ED for further evaluation.     She reports that she had left hip arthroplasty in January 2022.  At this time she was taking ibuprofen and Tylenol as needed for pain pretty regularly.  Her hip pain has improved but she is continue to take ibuprofen off and on for pain prevention prior to physical therapy.  2 days ago she was going to go hiking with her sister and took 800 mg of ibuprofen.  Since her hip surgery, she does note regular ibuprofen use, and she does have a history of taking naproxen and ibuprofen regularly for many years for neck and back pain.  She was told previously to take naproxen at bedtime to help keep her neck inflammation down,  "so she takes that most nights.  She also takes a baby aspirin every day and has so for many years for the possible benefit of colon cancer prevention due to a strong family history.  She also drinks 1 alcoholic drink at night. Also, she notes that she recently ran out of her OTC Famotidine medication and has not taken it in more than 1 week. She has history of IBS and GERD, she followed with a GI doctor in [place] for a while but has had overall manageable symptoms and has not established with anyone in [place].  She does not feel like this pain is her normal IBS pain.     She denies fever, chills, chest pain, shortness of breath, diarrhea, black or bloody stools, no urinary symptoms.     On arrival to the ED, her vital signs were notable for blood pressure 143/109 and heart rate 108.  She was afebrile and oxygen was 98% on room air.  BMP was within normal limits.  Bilirubin 1.4.  Hemoglobin 13.9.  ALT AST lipase within normal limits. CT Abdomen/pelvis showed: Focal wall thickening of the distal stomach. This could relate to a distal gastric ulcer or ulcerated mass. GI consultation for potential upper endoscopy is suggested. She was given IV Protonix, Zofran, and 1 L normal saline bolus, and admitted for further workup and GI consultation.      History is provided by patient, who is a good historian.     ASSESSMENT      Enrollment  Primary Care Care Coordination Status: Not a Candidate    Discharge Assessment  How are you doing now that you are home?: \"Much better\"  How are your symptoms? (Red Flag symptoms escalate to triage hotline per guidelines): Improved  Do you feel your condition is stable enough to be safe at home until your provider visit?: Yes  Does the patient have their discharge instructions? : Yes  Does the patient have questions regarding their discharge instructions? : No  Were you started on any new medications or were there changes to any of your previous medications? : Yes  Does the patient " have all of their medications?: Yes  Do you have questions regarding any of your medications? : No  Do you have all of your needed medical supplies or equipment (DME)?  (i.e. oxygen tank, CPAP, cane, etc.): Yes  Discharge follow-up appointment scheduled within 14 calendar days? : Yes  Discharge Follow Up Appointment Date: 05/02/22  Discharge Follow Up Appointment Scheduled with?: Primary Care Provider    Post-op ([facility] CTA Only)  If the patient had a surgery or procedure, do they have any questions for a nurse?: No    PLAN                                                      Outpatient Plan:    Please follow up pathology results and ensure patient is not taking NSAIDs    No future appointments.      For any urgent concerns, please contact our 24 hour nurse triage line: [phone])         RACHEAL Stewart  [phone]  Connected Care Resource [facility]

## 2022-07-07 ENCOUNTER — OFFICE VISIT (OUTPATIENT)
Dept: OBGYN | Facility: CLINIC | Age: 57
End: 2022-07-07
Payer: COMMERCIAL

## 2022-07-07 VITALS
SYSTOLIC BLOOD PRESSURE: 144 MMHG | HEART RATE: 88 BPM | TEMPERATURE: 97.3 F | BODY MASS INDEX: 26.52 KG/M2 | WEIGHT: 145 LBS | DIASTOLIC BLOOD PRESSURE: 93 MMHG

## 2022-07-07 DIAGNOSIS — Z12.4 SCREENING FOR CERVICAL CANCER: ICD-10-CM

## 2022-07-07 DIAGNOSIS — Z01.419 ENCOUNTER FOR GYNECOLOGICAL EXAMINATION WITHOUT ABNORMAL FINDING: Primary | ICD-10-CM

## 2022-07-07 PROBLEM — Z96.642 HISTORY OF TOTAL LEFT HIP REPLACEMENT: Status: ACTIVE | Noted: 2022-01-20

## 2022-07-07 PROCEDURE — G0145 SCR C/V CYTO,THINLAYER,RESCR: HCPCS | Performed by: OBSTETRICS & GYNECOLOGY

## 2022-07-07 PROCEDURE — 99396 PREV VISIT EST AGE 40-64: CPT | Performed by: OBSTETRICS & GYNECOLOGY

## 2022-07-07 PROCEDURE — 87624 HPV HI-RISK TYP POOLED RSLT: CPT | Performed by: OBSTETRICS & GYNECOLOGY

## 2022-07-07 ASSESSMENT — ANXIETY QUESTIONNAIRES
6. BECOMING EASILY ANNOYED OR IRRITABLE: SEVERAL DAYS
GAD7 TOTAL SCORE: 9
3. WORRYING TOO MUCH ABOUT DIFFERENT THINGS: SEVERAL DAYS
IF YOU CHECKED OFF ANY PROBLEMS ON THIS QUESTIONNAIRE, HOW DIFFICULT HAVE THESE PROBLEMS MADE IT FOR YOU TO DO YOUR WORK, TAKE CARE OF THINGS AT HOME, OR GET ALONG WITH OTHER PEOPLE: SOMEWHAT DIFFICULT
GAD7 TOTAL SCORE: 9
1. FEELING NERVOUS, ANXIOUS, OR ON EDGE: SEVERAL DAYS
2. NOT BEING ABLE TO STOP OR CONTROL WORRYING: SEVERAL DAYS
7. FEELING AFRAID AS IF SOMETHING AWFUL MIGHT HAPPEN: SEVERAL DAYS
5. BEING SO RESTLESS THAT IT IS HARD TO SIT STILL: MORE THAN HALF THE DAYS

## 2022-07-07 ASSESSMENT — PATIENT HEALTH QUESTIONNAIRE - PHQ9
SUM OF ALL RESPONSES TO PHQ QUESTIONS 1-9: 7
5. POOR APPETITE OR OVEREATING: MORE THAN HALF THE DAYS

## 2022-07-07 NOTE — PROGRESS NOTES
Attempted to reach, unable. Left message  to call back.  Tamika Quach RN     Norah is a 56 year old  who presents for annual exam.   Postmenopausal since 54.  She is having hot flashes, mild and decreased libido. No vaginal bleeding noted.     Besides routine health maintenance, she has no other health concerns today .  Grad school nearly done.  GYNECOLOGIC HISTORY:  Menarche: 11   Age at first intercourse: 14 Number of lifetime partners: 6  She is not sexually active   History sexually transmitted infections:No STD history  STI testing offered?  Declined  Estrogen replacement therapy: No  MEERA exposure: Unknown    History of abnormal Pap smear: YES - updated in Problem List and Health Maintenance accordingly  Family history of breast CA: Yes (Please explain): maternal great aunt  Family history of uterine/ovarian CA: No  Family history of colon CA: Yes (Please explain): father, maternal grandfather, paternal uncle    HEALTH MAINTENANCE:  Cholesterol: (No components found for: CHOL2 ) History of abnormal lipids: No  Mammo: 2021 . History of abnormal Mammo: No  Regular Self Breast Exams: Yes  Colonoscopy: 10/26/2017 History of abnormal Colonoscopy: No  Dexa: 2017 History of abnormal Dexa: No  Calcium/Vitamin D intake: source:  dairy, dietary supplement(s) Adequate? Yes  TSH: (No components found for: TSH1 )  Pap; (  Lab Results   Component Value Date    PAP NIL 2021    PAP NIL 2019    PAP NIL 2018    )    HISTORY:  OB History    Para Term  AB Living   2 2 2 0 0 2   SAB IAB Ectopic Multiple Live Births   0 0 0 0 2      # Outcome Date GA Lbr Donnie/2nd Weight Sex Delivery Anes PTL Lv   2 Term     M    [facility]   1 Term     M    [facility]      Complications: Abruptio Placenta     Past Medical History:   Diagnosis Date     Cervical high risk HPV (human papillomavirus) test positive 2018: see problem list.      Essential hypertension      Past Surgical History:   Procedure Laterality Date     APPENDECTOMY       ARTHROSCOPY KNEE Right  2005     BIOPSY CERVICAL, LOCAL EXCISION, SINGLE/MULTIPLE       BREAST BIOPSY, RT/LT Right      ENDOMETRIAL ABLATION       ENDOMETRIAL ABLATION  2006     ESOPHAGOSCOPY, GASTROSCOPY, DUODENOSCOPY (EGD), COMBINED N/A 2022    Procedure: ESOPHAGOGASTRODUODENOSCOPY (EGD) WITH BIOPSY;  Surgeon: Corby Grey MD;  Location: [facility] Main OR     KNEE ARTHROSCOPY W/ DEBRIDEMENT Right      MAMMOPLASTY REDUCTION BILATERAL       TUBAL LIGATION       TUBAL LIGATION       Family History   Problem Relation Age of Onset     Other - See Comments Mother         bone loss     Hypertension Mother      Colon Cancer Father      Other - See Comments Father         bone loss      Other - See Comments Sister         bone loss      Hypertension Sister      Colon Cancer Maternal Grandfather      Breast Cancer Other         maternal great aunt      Colon Cancer Paternal Uncle      Other - See Comments Sister         bone loss     Hypertension Sister      Uterine Cancer No family hx of      Ovarian Cancer No family hx of      Osteoporosis Mother      Osteoporosis Father      Alzheimer Disease Maternal Grandfather      Colon Cancer Paternal Grandfather      Colon Cancer Paternal Uncle      Social History     Socioeconomic History     Marital status: Single     Spouse name: None     Number of children: None     Years of education: None     Highest education level: None   Tobacco Use     Smoking status: Former Smoker     Start date:      Quit date: 2018     Years since quittin.5     Smokeless tobacco: Never Used   Vaping Use     Vaping Use: Never used   Substance and Sexual Activity     Alcohol use: Yes     Comment: one per night     Drug use: No     Sexual activity: Not Currently     Partners: Male     Birth control/protection: Female Surgical, Post-menopausal     Comment: Tubal        Current Outpatient Medications:      amLODIPine (NORVASC) 5 MG tablet, Take 5 mg by mouth daily, Disp: , Rfl:      aspirin (ASA) 81 MG EC  tablet, Take 81 mg by mouth At Bedtime, Disp: , Rfl:      calcium carbonate (TUMS) 500 MG chewable tablet, Take 2 chew tab by mouth At Bedtime, Disp: , Rfl:      cetirizine (ZYRTEC) 10 MG tablet, Take 10 mg by mouth At Bedtime, Disp: , Rfl:      fluticasone (FLONASE) 50 MCG/ACT spray, Spray 1 spray into both nostrils daily, Disp: , Rfl:      melatonin 3 MG tablet, Take 3 mg by mouth At Bedtime, Disp: , Rfl:      pantoprazole (PROTONIX) 40 MG EC tablet, Take 1 tablet (40 mg) by mouth 2 times daily, Disp: 120 tablet, Rfl: 0     Allergies   Allergen Reactions     Fenoprofen Hives     Codeine Nausea and Vomiting     Hydrocodone-Acetaminophen Nausea and Vomiting     Ibuprofen      Other reaction(s): Peptic Ulcer     Morphine Nausea and Vomiting     Penicillins Unknown and Other (See Comments)     Pt doesn't remember type of reaction  Pt doesn't remember type of reaction    Pt doesn't remember type of reaction  Pt doesn't remember type of reaction  Pt doesn't remember type of reaction     Sulfa Drugs Unknown and Other (See Comments)     Pt doesn't remember type of reaction  Pt doesn't remember type of reaction     Sulfasalazine Unknown     Venlafaxine Other (See Comments)     Pt seen in ER for HTN, tachycardia, etc.   Made all vital sign go very high, Went to Er    Made all vital sign go very high, Went to Er  Pt seen in ER for HTN, tachycardia, etc.   Made all vital sign go very high, Went to Er       Past medical, surgical, social and family history were reviewed and updated in EPIC.    ROS:   C:       NEGATIVE for fever, chills, change in weight  I:         NEGATIVE for worrisome rashes, moles or lesions  E:       NEGATIVE for vision changes or irritation  E/M:   NEGATIVE for ear, mouth and throat problems  R:       NEGATIVE for significant cough or SOB  CV:     NEGATIVE for chest pain, palpitations or peripheral edema  GI:      NEGATIVE for nausea, abdominal pain, heartburn, or change in bowel habits  :    NEGATIVE  for frequency, dysuria, hematuria, vaginal discharge, or bleeding  M:       NEGATIVE for significant arthralgias or myalgia  N:       NEGATIVE for weakness, dizziness or paresthesias  E:       NEGATIVE for temperature intolerance, skin/hair changes  P:       NEGATIVE for changes in mood or affect    EXAM:  BP (!) 144/93   Pulse 88   Temp 97.3  F (36.3  C)   Wt 65.8 kg (145 lb)   LMP 2019 (Approximate)   Breastfeeding No   BMI 26.52 kg/m     BMI: Body mass index is 26.52 kg/m .  Constitutional: healthy, alert and no distress  Head: Normocephalic. No masses, lesions, tenderness or abnormalities  Neck: Neck supple. Trachea midline. No adenopathy. Thyroid symmetric, normal size.   Cardiovascular: RRR.   Respiratory: Negative.   Breast: Breasts reveal mild symmetric fibrocystic densities, but there are no dominant, discrete, fixed or suspicious masses found.  Gastrointestinal: Abdomen soft, non-tender, non-distended. No masses, organomegaly  :  Vulva:  No external lesions, normal female hair distribution, no inguinal adenopathy.    Urethra:  Midline, non-tender, well supported, no discharge  Vagina:  Atrophic, no abnormal discharge, no lesions  Uterus:  Normal size, anteverted , non-tender, freely mobile  Ovaries:  No masses appreciated, non-tender, mobile  Rectal Exam: deferred  Musculoskeletal: extremities normal  Skin: no suspicious lesions or rashes  Psychiatric: Affect appropriate, cooperative,mentation appears normal.     COUNSELING:   Reviewed preventive health counseling, as reflected in patient instructions       Regular exercise       Healthy diet/nutrition       (Maddy)menopause management   reports that she quit smoking about 4 years ago. She started smoking about 46 years ago. She has never used smokeless tobacco.    Body mass index is 26.52 kg/m .  Weight management plan: Discussed healthy diet and exercise guidelines    FRAX Risk Assessment  ASSESSMENT:  56 year old  with satisfactory  annual exam  (Z01.419) Encounter for gynecological examination without abnormal finding  (primary encounter diagnosis)  Comment:   Plan: UTD on HM    (Z12.4) Screening for cervical cancer  Comment:   Plan: Pap imaged thin layer screen with HPV -         recommended age 30 - 65 years (select HPV order        below), HPV Hold (Lab Only), HPV High Risk         Types DNA Cervical

## 2022-07-11 LAB
BKR LAB AP GYN ADEQUACY: NORMAL
BKR LAB AP GYN INTERPRETATION: NORMAL
BKR LAB AP HPV REFLEX: NORMAL
BKR LAB AP PREVIOUS ABNORMAL: NORMAL
PATH REPORT.COMMENTS IMP SPEC: NORMAL
PATH REPORT.COMMENTS IMP SPEC: NORMAL
PATH REPORT.RELEVANT HX SPEC: NORMAL

## 2022-07-13 LAB
HUMAN PAPILLOMA VIRUS 16 DNA: NEGATIVE
HUMAN PAPILLOMA VIRUS 18 DNA: NEGATIVE
HUMAN PAPILLOMA VIRUS FINAL DIAGNOSIS: NORMAL
HUMAN PAPILLOMA VIRUS OTHER HR: NEGATIVE

## 2022-07-16 ENCOUNTER — HEALTH MAINTENANCE LETTER (OUTPATIENT)
Age: 57
End: 2022-07-16

## 2022-09-18 ENCOUNTER — HEALTH MAINTENANCE LETTER (OUTPATIENT)
Age: 57
End: 2022-09-18

## 2023-10-05 ENCOUNTER — OFFICE VISIT (OUTPATIENT)
Dept: OBGYN | Facility: CLINIC | Age: 58
End: 2023-10-05
Payer: COMMERCIAL

## 2023-10-05 VITALS
BODY MASS INDEX: 26.89 KG/M2 | SYSTOLIC BLOOD PRESSURE: 134 MMHG | HEART RATE: 81 BPM | DIASTOLIC BLOOD PRESSURE: 84 MMHG | WEIGHT: 147 LBS | TEMPERATURE: 98.2 F

## 2023-10-05 DIAGNOSIS — Z11.4 SCREENING FOR HIV (HUMAN IMMUNODEFICIENCY VIRUS): ICD-10-CM

## 2023-10-05 DIAGNOSIS — Z13.220 LIPID SCREENING: ICD-10-CM

## 2023-10-05 DIAGNOSIS — Z13.1 ENCOUNTER FOR SCREENING EXAMINATION FOR IMPAIRED GLUCOSE REGULATION AND DIABETES MELLITUS: ICD-10-CM

## 2023-10-05 DIAGNOSIS — N95.1 MENOPAUSAL SYNDROME (HOT FLASHES): ICD-10-CM

## 2023-10-05 DIAGNOSIS — Z12.31 ENCOUNTER FOR SCREENING MAMMOGRAM FOR BREAST CANCER: ICD-10-CM

## 2023-10-05 DIAGNOSIS — Z12.4 SCREENING FOR MALIGNANT NEOPLASM OF CERVIX: ICD-10-CM

## 2023-10-05 DIAGNOSIS — Z01.419 ENCOUNTER FOR GYNECOLOGICAL EXAMINATION WITHOUT ABNORMAL FINDING: Primary | ICD-10-CM

## 2023-10-05 PROCEDURE — 99396 PREV VISIT EST AGE 40-64: CPT | Performed by: OBSTETRICS & GYNECOLOGY

## 2023-10-05 PROCEDURE — G0145 SCR C/V CYTO,THINLAYER,RESCR: HCPCS | Performed by: OBSTETRICS & GYNECOLOGY

## 2023-10-05 PROCEDURE — 99213 OFFICE O/P EST LOW 20 MIN: CPT | Mod: 25 | Performed by: OBSTETRICS & GYNECOLOGY

## 2023-10-05 PROCEDURE — 87624 HPV HI-RISK TYP POOLED RSLT: CPT | Performed by: OBSTETRICS & GYNECOLOGY

## 2023-10-05 ASSESSMENT — ENCOUNTER SYMPTOMS
ARTHRALGIAS: 0
NERVOUS/ANXIOUS: 0
WEAKNESS: 0
PALPITATIONS: 0
DIZZINESS: 0
FREQUENCY: 0
JOINT SWELLING: 0
EYE PAIN: 0
CONSTIPATION: 0
HEMATOCHEZIA: 0
ABDOMINAL PAIN: 0
MYALGIAS: 1
SHORTNESS OF BREATH: 0
PARESTHESIAS: 0
HEMATURIA: 0
DIARRHEA: 0
CHILLS: 0
NAUSEA: 0
DYSURIA: 0
SORE THROAT: 0
HEADACHES: 1
COUGH: 0
FEVER: 0
BREAST MASS: 0
HEARTBURN: 1

## 2023-10-05 ASSESSMENT — ANXIETY QUESTIONNAIRES
7. FEELING AFRAID AS IF SOMETHING AWFUL MIGHT HAPPEN: NOT AT ALL
GAD7 TOTAL SCORE: 3
6. BECOMING EASILY ANNOYED OR IRRITABLE: NOT AT ALL
GAD7 TOTAL SCORE: 3
IF YOU CHECKED OFF ANY PROBLEMS ON THIS QUESTIONNAIRE, HOW DIFFICULT HAVE THESE PROBLEMS MADE IT FOR YOU TO DO YOUR WORK, TAKE CARE OF THINGS AT HOME, OR GET ALONG WITH OTHER PEOPLE: NOT DIFFICULT AT ALL
3. WORRYING TOO MUCH ABOUT DIFFERENT THINGS: NOT AT ALL
5. BEING SO RESTLESS THAT IT IS HARD TO SIT STILL: SEVERAL DAYS
2. NOT BEING ABLE TO STOP OR CONTROL WORRYING: NOT AT ALL
1. FEELING NERVOUS, ANXIOUS, OR ON EDGE: SEVERAL DAYS

## 2023-10-05 ASSESSMENT — PATIENT HEALTH QUESTIONNAIRE - PHQ9
5. POOR APPETITE OR OVEREATING: SEVERAL DAYS
SUM OF ALL RESPONSES TO PHQ QUESTIONS 1-9: 6

## 2023-10-05 NOTE — PROGRESS NOTES
Norah is a 58 year old  who presents for annual exam.   Postmenopausal since 55yo.  She is having hot flashes, mild and decreased libido. No vaginal bleeding noted.     Besides routine health maintenance, she has no other health concerns today .  Still having hot flashes.  Multiple times a day.  Keeps her up at night.  At least 4-5 flashes overnight.  Has vaginal dryness and discomfort.  Not currently sexually active.  Has sleep disruption from hot flashes.  Has brain fog.  Has word finding issues.  Has decided she would like to try hormone therapy.  No history of abnormal lipids.  No family history of breast cancer.  Does have family history of colon cancer.  GYNECOLOGIC HISTORY:  Menarche: 11   Age at first intercourse: 14 Number of lifetime partners: 6  She is not sexually active with   History sexually transmitted infections:No STD history  STI testing offered?  Declined  Estrogen replacement therapy: No  MEERA exposure: No    History of abnormal Pap smear: YES - updated in Problem List and Health Maintenance accordingly  Family history of breast CA: Yes (Please explain): m-great aunt  Family history of uterine/ovarian CA: No  Family history of colon CA: Yes (Please explain): father, mgfa, p-uncle    HEALTH MAINTENANCE:  Cholesterol: (No components found for: CHOL2 ) History of abnormal lipids: No  Mammo: 2021 . History of abnormal Mammo: No  Regular Self Breast Exams: Yes  Colonoscopy:  History of abnormal Colonoscopy: No  Dexa:  History of abnormal Dexa: No  Calcium/Vitamin D intake: source:  dairy, dietary supplement(s) Adequate? Yes  TSH: (No components found for: TSH1 )  Pap; (  Lab Results   Component Value Date    PAP NIL 2021    PAP NIL 2019    PAP NIL 2018    )    HISTORY:  OB History    Para Term  AB Living   2 2 2 0 0 2   SAB IAB Ectopic Multiple Live Births   0 0 0 0 2      # Outcome Date GA Lbr Donnie/2nd Weight Sex Delivery Anes PTL Lv   2 Term     M     [facility]   1 Term     M    [facility]      Complications: Abruptio Placenta     Past Medical History:   Diagnosis Date    Cervical high risk HPV (human papillomavirus) test positive 2018: see problem list.     Essential hypertension      Past Surgical History:   Procedure Laterality Date    APPENDECTOMY      ARTHROSCOPY KNEE Right 2005    BIOPSY CERVICAL, LOCAL EXCISION, SINGLE/MULTIPLE      BREAST BIOPSY, RT/LT Right     ENDOMETRIAL ABLATION      ENDOMETRIAL ABLATION  2006    ESOPHAGOSCOPY, GASTROSCOPY, DUODENOSCOPY (EGD), COMBINED N/A 2022    Procedure: ESOPHAGOGASTRODUODENOSCOPY (EGD) WITH BIOPSY;  Surgeon: Corby Grey MD;  Location: [facility] Main OR    KNEE ARTHROSCOPY W/ DEBRIDEMENT Right     MAMMOPLASTY REDUCTION BILATERAL      TUBAL LIGATION      TUBAL LIGATION       Family History   Problem Relation Age of Onset    Other - See Comments Mother         bone loss    Hypertension Mother     Colon Cancer Father     Other - See Comments Father         bone loss     Other - See Comments Sister         bone loss     Hypertension Sister     Colon Cancer Maternal Grandfather     Breast Cancer Other         maternal great aunt     Colon Cancer Paternal Uncle     Other - See Comments Sister         bone loss    Hypertension Sister     Uterine Cancer No family hx of     Ovarian Cancer No family hx of     Osteoporosis Mother     Osteoporosis Father     Alzheimer Disease Maternal Grandfather     Colon Cancer Paternal Grandfather     Colon Cancer Paternal Uncle      Social History     Socioeconomic History    Marital status: Single     Spouse name: None    Number of children: None    Years of education: None    Highest education level: None   Tobacco Use    Smoking status: Former     Types: Cigarettes     Start date:      Quit date: 2018     Years since quittin.7    Smokeless tobacco: Never   Vaping Use    Vaping Use: Never used   Substance and Sexual Activity    Alcohol use: Yes      Comment: one per night    Drug use: No    Sexual activity: Not Currently     Partners: Male     Birth control/protection: Female Surgical, Post-menopausal     Comment: Tubal        Current Outpatient Medications:     amLODIPine (NORVASC) 5 MG tablet, Take 5 mg by mouth daily, Disp: , Rfl:     aspirin (ASA) 81 MG EC tablet, Take 81 mg by mouth At Bedtime, Disp: , Rfl:     calcium carbonate (TUMS) 500 MG chewable tablet, Take 2 chew tab by mouth At Bedtime, Disp: , Rfl:     cetirizine (ZYRTEC) 10 MG tablet, Take 10 mg by mouth At Bedtime, Disp: , Rfl:     estradiol-norethindrone (COMBIPATCH) 0.05-0.25 MG/DAY bi-weekly patch, Place 1 patch onto the skin twice a week, Disp: 24 patch, Rfl: 3    fluticasone (FLONASE) 50 MCG/ACT spray, Spray 1 spray into both nostrils daily, Disp: , Rfl:     melatonin 3 MG tablet, Take 3 mg by mouth At Bedtime, Disp: , Rfl:     pantoprazole (PROTONIX) 40 MG EC tablet, Take 1 tablet (40 mg) by mouth 2 times daily, Disp: 120 tablet, Rfl: 0     Allergies   Allergen Reactions    Fenoprofen Hives    Codeine Nausea and Vomiting    Hydrocodone-Acetaminophen Nausea and Vomiting    Ibuprofen      Other reaction(s): Peptic Ulcer    Morphine Nausea and Vomiting    Penicillins Unknown and Other (See Comments)     Pt doesn't remember type of reaction  Pt doesn't remember type of reaction    Pt doesn't remember type of reaction  Pt doesn't remember type of reaction  Pt doesn't remember type of reaction    Sulfa Antibiotics Unknown and Other (See Comments)     Pt doesn't remember type of reaction  Pt doesn't remember type of reaction    Sulfasalazine Unknown    Venlafaxine Other (See Comments)     Pt seen in ER for HTN, tachycardia, etc.   Made all vital sign go very high, Went to Er    Made all vital sign go very high, Went to Er  Pt seen in ER for HTN, tachycardia, etc.   Made all vital sign go very high, Went to Er       Past medical, surgical, social and family history were reviewed and updated in  EPIC.    ROS:   C:       NEGATIVE for fever, chills, change in weight  I:         NEGATIVE for worrisome rashes, moles or lesions  E:       NEGATIVE for vision changes or irritation  E/M:   NEGATIVE for ear, mouth and throat problems  R:       NEGATIVE for significant cough or SOB  CV:     NEGATIVE for chest pain, palpitations or peripheral edema  GI:      NEGATIVE for nausea, abdominal pain, heartburn, or change in bowel habits  :    NEGATIVE for frequency, dysuria, hematuria, vaginal discharge, or bleeding  M:       NEGATIVE for significant arthralgias or myalgia  N:       NEGATIVE for weakness, dizziness or paresthesias  E:       NEGATIVE for temperature intolerance, skin/hair changes  P:       NEGATIVE for changes in mood or affect    EXAM:  /84   Pulse 81   Temp 98.2  F (36.8  C)   Wt 66.7 kg (147 lb)   LMP 03/01/2019 (Approximate)   BMI 26.89 kg/m     BMI: Body mass index is 26.89 kg/m .  Constitutional: healthy, alert and no distress  Head: Normocephalic. No masses, lesions, tenderness or abnormalities  Neck: Neck supple. Trachea midline. No adenopathy. Thyroid symmetric, normal size.   Cardiovascular: RRR.   Respiratory: Negative.   Breast: Breasts reveal mild symmetric fibrocystic densities, but there are no dominant, discrete, fixed or suspicious masses found.  Gastrointestinal: Abdomen soft, non-tender, non-distended. No masses, organomegaly  :  Vulva:  No external lesions, normal female hair distribution, no inguinal adenopathy.    Urethra:  Midline, non-tender, well supported, no discharge  Vagina:  Atrophic, no abnormal discharge, no lesions  Uterus:  Normal size, anteverted, non-tender, freely mobile  Ovaries:  No masses appreciated, non-tender, mobile  Rectal Exam: deferred  Musculoskeletal: extremities normal  Skin: no suspicious lesions or rashes  Psychiatric: Affect appropriate, cooperative,mentation appears normal.     COUNSELING:   Reviewed preventive health counseling, as  reflected in patient instructions       Regular exercise       Healthy diet/nutrition       (Maddy)menopause management   reports that she quit smoking about 5 years ago. She started smoking about 47 years ago. She has never used smokeless tobacco.    Body mass index is 26.89 kg/m .  Weight management plan: Discussed healthy diet and exercise guidelines    FRAX Risk Assessment  ASSESSMENT:  58 year old  with satisfactory annual exam  (Z01.419) Encounter for gynecological examination without abnormal finding  (primary encounter diagnosis)  Comment:   Plan: UTD on HM when labs done  Lung cancer screening CT done in   Flu shot tomorrow at work    (Z12.31) Encounter for screening mammogram for breast cancer  Comment:   Plan: MA Screen Bilateral w/Fredis            (Z13.220) Lipid screening  Comment:   Plan: Lipid Profile (Chol, Trig, HDL, LDL calc)            (Z13.1) Encounter for screening examination for impaired glucose regulation and diabetes mellitus  Comment:   Plan: Hemoglobin A1c            (Z11.4) Screening for HIV (human immunodeficiency virus)  Comment:   Plan: HIV Antigen Antibody Combo            (Z12.4) Screening for malignant neoplasm of cervix  Comment:   Plan: Pap screen with HPV - recommended age 30 - 65         years        Patient request.    (N95.1) Menopausal syndrome (hot flashes)  Comment:   Plan: estradiol-norethindrone (COMBIPATCH) 0.05-0.25         MG/DAY bi-weekly patch        Discussed menopausal hormone replacement therapy.  Discussed indication for relief of hot flashes.  May have beneficial effect on sleep and brain fog.  May not affect genitourinary syndrome of menopause.  Open to vaginal topical therapy if MHT is not enough.  Discussed risk, benefit, side effects.  Discussed time to onset of relief.  We will check in by Krista in 1 month.  Add topical vaginal estrogen then if needed.  Increase patch dose then if needed.  Discussed need for estrogen plus progesterone.  We will  start with progesterone through the patch and changed to oral micronized progesterone if needed.  Discussed reevaluation at age 59.  Answers submitted by the patient for this visit:  Annual Preventive Visit (Submitted on 10/5/2023)  Chief Complaint: Annual Exam:  Frequency of exercise:: 4-5 days/week  Getting at least 3 servings of Calcium per day:: Yes  Diet:: Regular (no restrictions)  Taking medications regularly:: Yes  Medication side effects:: Not applicable  Bi-annual eye exam:: Yes  Dental care twice a year:: Yes  Sleep apnea or symptoms of sleep apnea:: Daytime drowsiness  abdominal pain: No  Blood in stool: No  Blood in urine: No  chest pain: No  chills: No  congestion: No  constipation: No  cough: No  diarrhea: No  dizziness: No  ear pain: No  eye pain: No  nervous/anxious: No  fever: No  frequency: No  genital sores: No  headaches: Yes  hearing loss: No  heartburn: Yes  arthralgias: No  joint swelling: No  peripheral edema: No  mood changes: No  myalgias: Yes  nausea: No  dysuria: No  palpitations: No  Skin sensation changes: No  sore throat: No  urgency: No  rash: No  shortness of breath: No  visual disturbance: No  weakness: No  pelvic pain: No  vaginal bleeding: No  vaginal discharge: No  tenderness: No  breast mass: No  breast discharge: No  Additional concerns today:: No  Exercise outside of work (Submitted on 10/5/2023)  Chief Complaint: Annual Exam:  Duration of exercise:: 15-30 minutes

## 2023-10-08 ENCOUNTER — HEALTH MAINTENANCE LETTER (OUTPATIENT)
Age: 58
End: 2023-10-08

## 2023-10-10 ENCOUNTER — LAB (OUTPATIENT)
Dept: LAB | Facility: CLINIC | Age: 58
End: 2023-10-10
Payer: COMMERCIAL

## 2023-10-10 DIAGNOSIS — Z11.4 SCREENING FOR HIV (HUMAN IMMUNODEFICIENCY VIRUS): ICD-10-CM

## 2023-10-10 DIAGNOSIS — Z13.220 LIPID SCREENING: ICD-10-CM

## 2023-10-10 DIAGNOSIS — Z13.1 ENCOUNTER FOR SCREENING EXAMINATION FOR IMPAIRED GLUCOSE REGULATION AND DIABETES MELLITUS: ICD-10-CM

## 2023-10-10 LAB
CHOLEST SERPL-MCNC: 187 MG/DL
HBA1C MFR BLD: 5.3 % (ref 0–5.6)
HDLC SERPL-MCNC: 88 MG/DL
HIV 1+2 AB+HIV1 P24 AG SERPL QL IA: NONREACTIVE
LDLC SERPL CALC-MCNC: 88 MG/DL
NONHDLC SERPL-MCNC: 99 MG/DL
TRIGL SERPL-MCNC: 53 MG/DL

## 2023-10-10 PROCEDURE — 87389 HIV-1 AG W/HIV-1&-2 AB AG IA: CPT

## 2023-10-10 PROCEDURE — 83036 HEMOGLOBIN GLYCOSYLATED A1C: CPT

## 2023-10-10 PROCEDURE — 80061 LIPID PANEL: CPT

## 2023-10-10 PROCEDURE — 36415 COLL VENOUS BLD VENIPUNCTURE: CPT

## 2024-02-01 ENCOUNTER — MYC MEDICAL ADVICE (OUTPATIENT)
Dept: OBGYN | Facility: CLINIC | Age: 59
End: 2024-02-01
Payer: COMMERCIAL

## 2024-02-01 DIAGNOSIS — N93.9 ABNORMAL UTERINE BLEEDING (AUB): Primary | ICD-10-CM

## 2024-02-01 NOTE — TELEPHONE ENCOUNTER
Pt using HRT patches; combipatch-started in October per chart?  Noticing some dark brown spotting needing to wear a pantyliner  Any concerns or want to do any imaging? Pelvic  ultrasound pended if appropriate  Routing to provider to advise.  Esther Basilio RN on 2/1/2024 at 9:23 AM

## 2024-03-21 ENCOUNTER — ANCILLARY PROCEDURE (OUTPATIENT)
Dept: ULTRASOUND IMAGING | Facility: CLINIC | Age: 59
End: 2024-03-21
Attending: OBSTETRICS & GYNECOLOGY
Payer: COMMERCIAL

## 2024-03-21 DIAGNOSIS — N93.9 ABNORMAL UTERINE BLEEDING (AUB): ICD-10-CM

## 2024-03-21 PROCEDURE — 76830 TRANSVAGINAL US NON-OB: CPT | Performed by: OBSTETRICS & GYNECOLOGY

## 2024-08-27 DIAGNOSIS — N95.1 MENOPAUSAL SYNDROME (HOT FLASHES): ICD-10-CM

## 2024-09-09 DIAGNOSIS — N95.1 MENOPAUSAL SYNDROME (HOT FLASHES): ICD-10-CM

## 2024-09-09 NOTE — TELEPHONE ENCOUNTER
Requested Prescriptions   Pending Prescriptions Disp Refills    estradiol-norethindrone (COMBIPATCH) 0.05-0.25 MG/DAY bi-weekly patch 24 patch 3     Sig: Place 1 patch onto the skin twice a week.       There is no refill protocol information for this order        Last Written Prescription Date:  10/9/23  Last Fill Quantity: 24,  # refills: 3   Last office visit: 10/5/23 with Dr. Bautista  Future Office Visit:    None    No protocol for patch.   Routing to provider to advise.  Esther Basilio RN on 9/9/2024 at 11:29 AM

## 2025-01-12 ENCOUNTER — HEALTH MAINTENANCE LETTER (OUTPATIENT)
Age: 60
End: 2025-01-12

## 2025-02-09 ENCOUNTER — HEALTH MAINTENANCE LETTER (OUTPATIENT)
Age: 60
End: 2025-02-09

## 2025-03-01 NOTE — LETTER
June 23, 2019      Norah Guzman  [address] [place] [address]    Dear ,      At [facility], your health and wellness is our primary concern. That is why we are following up on a positive high risk HPV test from 7/5/18. Your provider had recommended that you have a Pap smear and HPV test completed by 7/5/19. Our records do not show that this has been scheduled.    It is important to complete the follow up that your provider has suggested for you to ensure that there are no worsening changes which may, over time, develop into cancer.      Please contact our office at  [phone] to schedule an appointment for a Pap smear and HPV test at your earliest convenience. If you have questions or concerns, please call the clinic and we will be happy to assist you.    If you have completed the tests outside of [facility], please have the results forwarded to our office. We will update the chart for your primary Physician to review before your next annual physical.     Thank you for choosing [facility]!    Sincerely,      Your [facility] Care Team/binu     Lethargy Acute UTI

## 2025-06-07 NOTE — PROGRESS NOTES
07/05/18: NIL pap, + HR HPV (not 16 or 18) result. Plan cotest in 1 year. Pt was advised.  07/18/18 Result letter sent at request of RN. ([facility])  6/23/19 Cotest reminder letter sent ([facility])  8/22/19 NIL, Neg HPV. Plan 3 yr co-test ([facility])  9/3/19 MyChart result letter sent and tracking closed.  Lynette Nissen RN       no no history of blood product transfusion

## 2025-06-17 ENCOUNTER — OFFICE VISIT (OUTPATIENT)
Dept: OBGYN | Facility: CLINIC | Age: 60
End: 2025-06-17
Payer: COMMERCIAL

## 2025-06-17 VITALS
TEMPERATURE: 97 F | BODY MASS INDEX: 27.25 KG/M2 | SYSTOLIC BLOOD PRESSURE: 123 MMHG | DIASTOLIC BLOOD PRESSURE: 86 MMHG | WEIGHT: 149 LBS | HEART RATE: 79 BPM

## 2025-06-17 DIAGNOSIS — Z12.4 SCREENING FOR MALIGNANT NEOPLASM OF CERVIX: ICD-10-CM

## 2025-06-17 DIAGNOSIS — N95.1 MENOPAUSAL SYNDROME (HOT FLASHES): ICD-10-CM

## 2025-06-17 DIAGNOSIS — Z01.419 ENCOUNTER FOR GYNECOLOGICAL EXAMINATION WITHOUT ABNORMAL FINDING: Primary | ICD-10-CM

## 2025-06-17 PROCEDURE — 99396 PREV VISIT EST AGE 40-64: CPT | Performed by: OBSTETRICS & GYNECOLOGY

## 2025-06-17 PROCEDURE — 99459 PELVIC EXAMINATION: CPT | Performed by: OBSTETRICS & GYNECOLOGY

## 2025-06-17 PROCEDURE — G0145 SCR C/V CYTO,THINLAYER,RESCR: HCPCS | Performed by: OBSTETRICS & GYNECOLOGY

## 2025-06-17 PROCEDURE — 87624 HPV HI-RISK TYP POOLED RSLT: CPT | Performed by: OBSTETRICS & GYNECOLOGY

## 2025-06-17 PROCEDURE — 99213 OFFICE O/P EST LOW 20 MIN: CPT | Mod: 25 | Performed by: OBSTETRICS & GYNECOLOGY

## 2025-06-17 NOTE — PROGRESS NOTES
"Norah is a 59 year old  who presents for annual exam.   Postmenopausal since 55yo.  She is having hot flashes, mild and night sweats. No vaginal bleeding noted.     Besides routine health maintenance, she has no other health concerns today .  Stopped the patch when she ran out of refills. Symptoms are better now than before starting the patch. Feels like hot flashes are manageable. Sleeping better.   Has osteopenia. Working on going on more frequent walks.   Feels body composition changes.   GYNECOLOGIC HISTORY:  Menarche: 11   Age at first intercourse: 14 Number of lifetime partners: 6  She is not sexually active   History sexually transmitted infections:No STD history  STI testing offered?  Declined  Estrogen replacement therapy: No  MEERA exposure: No    History of abnormal Pap smear: YES - reflected in Problem List and Health Maintenance accordingly  Family history of breast CA: Yes (Please explain): m-great aunt   Family history of uterine/ovarian CA: No  Family history of colon CA: Yes (Please explain): father, mgfa, p-uncle    HEALTH MAINTENANCE:  Cholesterol: (No components found for: \"CHOL2\" ) History of abnormal lipids: No  Mammo:  . History of abnormal Mammo: No  Regular Self Breast Exams: No  Colonoscopy:  History of abnormal Colonoscopy: No  Dexa: no History of abnormal Dexa: No  Calcium/Vitamin D intake: source:  dairy, dietary supplement(s) Adequate? Yes  TSH: (No components found for: \"TSH1\" )  Pap; (  Lab Results   Component Value Date    PAP NIL 2021    PAP NIL 2019    PAP NIL 2018    )    HISTORY:  OB History    Para Term  AB Living   2 2 2 0 0 2   SAB IAB Ectopic Multiple Live Births   0 0 0 0 2      # Outcome Date GA Lbr Donnie/2nd Weight Sex Type Anes PTL Lv   2 Term     M    [facility]   1 Term     M    [facility]      Complications: Abruptio Placenta     Past Medical History:   Diagnosis Date    Cervical high risk HPV (human papillomavirus) test positive " 2018: see problem list.     Essential hypertension      Past Surgical History:   Procedure Laterality Date    APPENDECTOMY      ARTHROSCOPY KNEE Right 2005    BIOPSY CERVICAL, LOCAL EXCISION, SINGLE/MULTIPLE      BREAST BIOPSY, RT/LT Right     ENDOMETRIAL ABLATION      ENDOMETRIAL ABLATION  2006    ESOPHAGOSCOPY, GASTROSCOPY, DUODENOSCOPY (EGD), COMBINED N/A 2022    Procedure: ESOPHAGOGASTRODUODENOSCOPY (EGD) WITH BIOPSY;  Surgeon: Corby Grey MD;  Location: [facility] Main OR    KNEE ARTHROSCOPY W/ DEBRIDEMENT Right     MAMMOPLASTY REDUCTION BILATERAL      TUBAL LIGATION      TUBAL LIGATION       Family History   Problem Relation Age of Onset    Other - See Comments Mother         bone loss    Hypertension Mother     Colon Cancer Father     Other - See Comments Father         bone loss     Other - See Comments Sister         bone loss     Hypertension Sister     Colon Cancer Maternal Grandfather     Breast Cancer Other         maternal great aunt     Colon Cancer Paternal Uncle     Other - See Comments Sister         bone loss    Hypertension Sister     Uterine Cancer No family hx of     Ovarian Cancer No family hx of     Osteoporosis Mother     Osteoporosis Father     Alzheimer Disease Maternal Grandfather     Colon Cancer Paternal Grandfather     Colon Cancer Paternal Uncle      Social History     Socioeconomic History    Marital status: Single   Tobacco Use    Smoking status: Former     Current packs/day: 0.00     Types: Cigarettes     Start date:      Quit date: 2018     Years since quittin.4    Smokeless tobacco: Never   Vaping Use    Vaping status: Never Used   Substance and Sexual Activity    Alcohol use: Yes     Comment: one per night    Drug use: No    Sexual activity: Not Currently     Partners: Male     Birth control/protection: Female Surgical, Post-menopausal     Comment: Tubal      Social Drivers of Health     Financial Resource Strain: Low Risk  (2023)     Received from [facility]    Financial Resource Strain     Difficulty of Paying Living Expenses: 3   Food Insecurity: No Food Insecurity (12/17/2023)    Received from [facility]    Food Insecurity     Worried About Running Out of Food in the Last Year: 1   Transportation Needs: No Transportation Needs (12/17/2023)    Received from [facility]    Transportation Needs     Lack of Transportation (Medical): 1   Social Connections: Socially Integrated (12/17/2023)    Received from [facility]    Social Connections     Frequency of Communication with Friends and Family: 0   Housing Stability: Low Risk  (12/17/2023)    Received from [facility]    Housing Stability     Unable to Pay for Housing in the Last Year: 1       Current Outpatient Medications:     amLODIPine (NORVASC) 5 MG tablet, Take 5 mg by mouth daily, Disp: , Rfl:     calcium carbonate (TUMS) 500 MG chewable tablet, Take 2 chew tab by mouth At Bedtime, Disp: , Rfl:     cetirizine (ZYRTEC) 10 MG tablet, Take 10 mg by mouth At Bedtime, Disp: , Rfl:     fluticasone (FLONASE) 50 MCG/ACT spray, Spray 1 spray into both nostrils daily, Disp: , Rfl:      Allergies   Allergen Reactions    Fenoprofen Hives    Codeine Nausea and Vomiting    Hydrocodone-Acetaminophen Nausea and Vomiting    Ibuprofen      Other reaction(s): Peptic Ulcer    Morphine Nausea and Vomiting    Penicillins Unknown and Other (See Comments)     Pt doesn't remember type of reaction  Pt doesn't remember type of reaction    Pt doesn't remember type of reaction  Pt doesn't remember type of reaction  Pt doesn't remember type of reaction    Sulfa Antibiotics Unknown and Other (See Comments)     Pt doesn't remember type of reaction  Pt doesn't remember type of reaction    Sulfasalazine Unknown    Venlafaxine Other (See Comments)     Pt seen  in ER for HTN, tachycardia, etc.   Made all vital sign go very high, Went to Er    Made all vital sign go very high, Went to Er  Pt seen in ER for HTN, tachycardia, etc.   Made all vital sign go very high, Went to Er       Past medical, surgical, social and family history were reviewed and updated in EPIC.    ROS:   C:       NEGATIVE for fever, chills, change in weight  I:         NEGATIVE for worrisome rashes, moles or lesions  E:       NEGATIVE for vision changes or irritation  E/M:   NEGATIVE for ear, mouth and throat problems  R:       NEGATIVE for significant cough or SOB  CV:     NEGATIVE for chest pain, palpitations or peripheral edema  GI:      NEGATIVE for nausea, abdominal pain, heartburn, or change in bowel habits  :    NEGATIVE for frequency, dysuria, hematuria, vaginal discharge, or bleeding  M:       NEGATIVE for significant arthralgias or myalgia  N:       NEGATIVE for weakness, dizziness or paresthesias  E:       NEGATIVE for temperature intolerance, skin/hair changes  P:       NEGATIVE for changes in mood or affect    EXAM:  /86   Pulse 79   Temp 97  F (36.1  C)   Wt 67.6 kg (149 lb)   LMP 03/01/2019 (Approximate)   BMI 27.25 kg/m     BMI: Body mass index is 27.25 kg/m .  Constitutional: healthy, alert and no distress  Head: Normocephalic. No masses, lesions, tenderness or abnormalities  Neck: Neck supple. Trachea midline. No adenopathy. Thyroid symmetric, normal size.   Cardiovascular: RRR.   Respiratory: Negative.   Breast: Breasts reveal mild symmetric fibrocystic densities, but there are no dominant, discrete, fixed or suspicious masses found.  Gastrointestinal: Abdomen soft, non-tender, non-distended. No masses, organomegaly  :  Vulva:  No external lesions, normal female hair distribution, no inguinal adenopathy.    Urethra:  Midline, non-tender, well supported, no discharge  Vagina:  Atrophic, no abnormal discharge, no lesions  Uterus:  Normal size, *** , non-tender, freely  "mobile  Ovaries:  No masses appreciated, non-tender, mobile  Rectal Exam: {RECTAL EXAM:462690}  Musculoskeletal: extremities normal  Skin: no suspicious lesions or rashes  Psychiatric: Affect appropriate, cooperative,mentation appears normal.     COUNSELING:   {FEMALE COUNSELING MESSAGES:670110::\"Reviewed preventive health counseling, as reflected in patient instructions\"}   reports that she quit smoking about 7 years ago. She started smoking about 49 years ago. She has never used smokeless tobacco.  {Tobacco Cessation -- Delete if patient is a non-smoker:303460}  Body mass index is 27.25 kg/m .  {Obesity Action Plan -- Delete if BMA < 25:236113}    FRAX Risk Assessment  ASSESSMENT:  59 year old  with satisfactory annual exam  {DIAG PICKLIST:919934}    "

## 2025-06-18 LAB
HPV HR 12 DNA CVX QL NAA+PROBE: NEGATIVE
HPV16 DNA CVX QL NAA+PROBE: NEGATIVE
HPV18 DNA CVX QL NAA+PROBE: NEGATIVE
HUMAN PAPILLOMA VIRUS FINAL DIAGNOSIS: NORMAL

## 2025-06-19 ENCOUNTER — RESULTS FOLLOW-UP (OUTPATIENT)
Dept: OBGYN | Facility: CLINIC | Age: 60
End: 2025-06-19

## 2025-06-22 LAB
BKR AP ASSOCIATED HPV REPORT: NORMAL
BKR LAB AP GYN ADEQUACY: NORMAL
BKR LAB AP GYN INTERPRETATION: NORMAL
BKR LAB AP PREVIOUS ABNORMAL: NORMAL
PATH REPORT.COMMENTS IMP SPEC: NORMAL
PATH REPORT.COMMENTS IMP SPEC: NORMAL
PATH REPORT.RELEVANT HX SPEC: NORMAL

## (undated) DEVICE — TUBING SUCTION MEDI-VAC 1/4"X20' N620A - HE

## (undated) DEVICE — SUCTION MANIFOLD NEPTUNE 2 SYS 1 PORT 702-025-000

## (undated) DEVICE — SOL WATER IRRIG 1000ML BOTTLE 2F7114

## (undated) DEVICE — FORCEP BIOPSY 2.3MM DISP COATED 000388